# Patient Record
Sex: MALE | Race: WHITE | NOT HISPANIC OR LATINO | Employment: OTHER | URBAN - METROPOLITAN AREA
[De-identification: names, ages, dates, MRNs, and addresses within clinical notes are randomized per-mention and may not be internally consistent; named-entity substitution may affect disease eponyms.]

---

## 2017-01-16 ENCOUNTER — OFFICE VISIT (OUTPATIENT)
Dept: CARDIOLOGY | Facility: CLINIC | Age: 57
End: 2017-01-16

## 2017-01-16 VITALS
DIASTOLIC BLOOD PRESSURE: 84 MMHG | HEART RATE: 62 BPM | HEIGHT: 69 IN | BODY MASS INDEX: 38.51 KG/M2 | WEIGHT: 260 LBS | SYSTOLIC BLOOD PRESSURE: 118 MMHG

## 2017-01-16 DIAGNOSIS — I10 ESSENTIAL HYPERTENSION: ICD-10-CM

## 2017-01-16 DIAGNOSIS — I25.5 ISCHEMIC CARDIOMYOPATHY: Primary | ICD-10-CM

## 2017-01-16 DIAGNOSIS — E78.5 DYSLIPIDEMIA: ICD-10-CM

## 2017-01-16 PROCEDURE — 99213 OFFICE O/P EST LOW 20 MIN: CPT | Performed by: PHYSICIAN ASSISTANT

## 2017-01-16 RX ORDER — ATORVASTATIN CALCIUM 40 MG/1
40 TABLET, FILM COATED ORAL NIGHTLY
Qty: 90 TABLET | Refills: 3 | Status: SHIPPED | OUTPATIENT
Start: 2017-01-16 | End: 2019-03-18 | Stop reason: SDUPTHER

## 2017-01-16 RX ORDER — CARVEDILOL 25 MG/1
25 TABLET ORAL 2 TIMES DAILY
Qty: 180 TABLET | Refills: 3 | Status: SHIPPED | OUTPATIENT
Start: 2017-01-16 | End: 2019-03-18 | Stop reason: SDUPTHER

## 2017-01-16 RX ORDER — GABAPENTIN 300 MG/1
600 CAPSULE ORAL 2 TIMES DAILY
COMMUNITY
Start: 2017-01-09 | End: 2018-09-12 | Stop reason: DRUGHIGH

## 2017-01-16 RX ORDER — POTASSIUM CHLORIDE 750 MG/1
10 CAPSULE, EXTENDED RELEASE ORAL DAILY
Qty: 90 CAPSULE | Refills: 3 | Status: SHIPPED | OUTPATIENT
Start: 2017-01-16 | End: 2018-09-12 | Stop reason: SDDI

## 2017-01-16 RX ORDER — CLOPIDOGREL BISULFATE 75 MG/1
75 TABLET ORAL DAILY
Qty: 90 TABLET | Refills: 3 | Status: SHIPPED | OUTPATIENT
Start: 2017-01-16 | End: 2019-03-18 | Stop reason: SDUPTHER

## 2017-01-16 RX ORDER — NITROGLYCERIN 0.4 MG/1
0.4 TABLET SUBLINGUAL AS NEEDED
COMMUNITY
Start: 2017-01-09

## 2017-01-16 RX ORDER — TORSEMIDE 20 MG/1
20 TABLET ORAL DAILY
Qty: 90 TABLET | Refills: 3 | Status: SHIPPED | OUTPATIENT
Start: 2017-01-16 | End: 2019-03-18 | Stop reason: SDUPTHER

## 2017-01-16 NOTE — LETTER
January 16, 2017     Osvaldo Vargas DO  [address]  [address]  [place] [address]    Patient: Pawan Easton   YOB: 1960   Date of Visit: 1/16/2017       Dear Dr. Sam DO:    Thank you for referring Pawan Easton to me for evaluation. Below are the relevant portions of my assessment and plan of care.    If you have questions, please do not hesitate to call me. I look forward to following Pawan along with you.         Sincerely,        RITA Olguin        CC: No Recipients  RITA Olguin  1/16/2017  3:27 PM  Signed       [facility] - Office Note  Pawan Easton         [address]  [place] [address]  1960   [phone] (home)      LOCATION:  [facility].  Visit Type: Follow Up.    PCP:  Osvaldo Vargas DO    01/16/17   Pawan Easton is a 56 y.o.  male  retired.      Chief Complaint:  Follow-up on Coronary Artery Disease.  Pain in bilateral nipple area.  1. Coronary artery disease:  a. 12/29/2016, cardiac catheterization for ST elevated myocardial infarction with multiple lesions in the RCA in the mid and into the proximal segment. RV branch had an 80% ostial stenosis; 60% to 70% mid RCA; LAD had subtotal occlusion; circumflex OM was 40%.  b. 12/30/2015, reported placement of a Promus drug-eluting stent.  c. January 2016, cardiac catheterization for acute ST elevated myocardial infarction with placement of a 3.0 x 16 Promus drug-eluting stent to the proximal LAD. EF of 35%.  d. 02/05/2016, cardiac catheterization by Dr. Joey Self which showed an EF of 10% to 15%. Widely patent proximal mid LAD stent. Small jailed 80% to 90% first diagonal (1.5 mm vessel). Diffuse 80% stenosis of the ramus intermedius. 30% RCA and right PDA, and 50% distal LAD.   2. Ischemic cardiomyopathy/class 1-2:  a. 02/05/2016, echocardiogram with an EF of 25% to 30%, mild mitral regurgitation, and mild tricuspid regurgitation.  3. Systolic congestive heart failure:  a. February  2016 admission which required mechanical ventilation.   4. Hypertension.   5. Dyslipidemia.   6. Diabetes.   7. Chronic obstructive pulmonary disease.   8. Obstructive sleep apnea.   9. Obesity.   10. Chronic renal insufficiency.    No Known Allergies      Current Outpatient Prescriptions:   •  aspirin 325 MG tablet, Take 325 mg by mouth daily., Disp: , Rfl:   •  atorvastatin (LIPITOR) 40 MG tablet, Take 1 tablet by mouth every night., Disp: 90 tablet, Rfl: 3  •  buPROPion SR (WELLBUTRIN SR) 100 MG 12 hr tablet, Take 100 mg by mouth 2 (two) times a day., Disp: , Rfl:   •  carvedilol (COREG) 25 MG tablet, Take 1 tablet by mouth 2 (two) times a day., Disp: 180 tablet, Rfl: 3  •  clonazePAM (KlonoPIN) 0.5 MG tablet, Take 0.5 mg by mouth 2 (two) times a day as needed for seizures., Disp: , Rfl:   •  clopidogrel (PLAVIX) 75 MG tablet, Take 1 tablet by mouth Daily., Disp: 90 tablet, Rfl: 3  •  ENTRESTO 24-26 MG tablet, TAKE ONE TABLET BY MOUTH TWICE DAILY, Disp: 180 tablet, Rfl: 2  •  gabapentin (NEURONTIN) 300 MG capsule, 2 (Two) Times a Day., Disp: , Rfl:   •  glipiZIDE (GLUCOTROL) 5 MG tablet, Take 5 mg by mouth daily., Disp: , Rfl:   •  nitroglycerin (NITROSTAT) 0.4 MG SL tablet, As Needed., Disp: , Rfl:   •  Omega-3 Fatty Acids (FISH OIL) 1000 MG capsule capsule, Take  by mouth 2 (two) times a day., Disp: , Rfl:   •  pantoprazole (PROTONIX) 40 MG EC tablet, Take 40 mg by mouth daily., Disp: , Rfl:   •  potassium chloride (MICRO-K) 10 MEQ CR capsule, Take 1 capsule by mouth daily., Disp: 90 capsule, Rfl: 3  •  sitaGLIPtin (JANUVIA) 100 MG tablet, Take 100 mg by mouth daily., Disp: , Rfl:   •  spironolactone (ALDACTONE) 50 MG tablet, Take 1 tablet by mouth daily., Disp: 90 tablet, Rfl: 3  •  torsemide (DEMADEX) 20 MG tablet, Take 1 tablet by mouth daily., Disp: 90 tablet, Rfl: 3    HPI  Mr. corona is here for routine follow-up on his cardiomyopathy, hypertension and dyslipidemia.  Since his last visit, he's done fairly  "well from a cardiac standpoint.  He has no complaints of chest pain, no progressive dyspnea or weakness.  He is compliant with his CPAP but is very restless at night.  He states he rolls back and forth a lot and believes this is cause for muscle pain in his chest area.  He also complains of bilateral nipple pain described as a burning sensation.  It is tender to touch.  He thinks his breast tissue in that area may be bigger but cannot confirm with certainty.  The following portions of the patient's history were reviewed in the chart and updated as appropriate: allergies, current medications, past family history, past medical history, past social history, past surgical history and problem list.    Review of Systems   All other systems reviewed and are negative.      height is 69\" (175.3 cm) and weight is 260 lb (118 kg). His blood pressure is 118/84 and his pulse is 62.   Physical Exam   Constitutional: Vital signs are normal. He appears well-developed and well-nourished.   Cardiovascular: Normal rate, regular rhythm, S1 normal, S2 normal, normal heart sounds, intact distal pulses and normal pulses.    Pulmonary/Chest: Effort normal and breath sounds normal. He has no wheezes. He has no rhonchi. He has no rales.   Abdominal: Soft. Normal appearance and bowel sounds are normal. There is no hepatosplenomegaly.   Neurological: He is alert.         Procedures   St. Farhan ICD:  Atrial P wave 2.8, threshold 0.87, impedance 380 ohms.  Pacing 2.3%.  RV pacing less than 1%, R-wave greater than 12, threshold 1.25, impedance 530 ohms.  Battery voltage 92% with a charge time of 7 seconds.  No A. fib.  Assessment/ Plan     Ischemic cardiomyopathy:  Stable and asymptomatic.  Device interrogation is within normal limits.  Continue on current medical regimen with the cessation of Aldactone secondary to possible gynecomastia and nipple pain.  I advised him to hold this for 10 days and call us with any symptoms that are persistent or " those that resolved.  Return to clinic 6 months with a repeat device check or sooner when necessary.    Essential hypertension:  Well-controlled.  Continue current medical regimen.    Dyslipidemia:  Continue with Lipitor, fish oil and appropriate diet with routine bloodwork by PCP.    RITA Olguin

## 2017-01-16 NOTE — PROGRESS NOTES
[facility] - Office Note  Pawan Easton         [address]  [place] [address]  1960   [phone] (home)      LOCATION:  [facility].  Visit Type: Follow Up.    PCP:  Osvaldo Vargas DO    01/16/17   Pawan Easton is a 56 y.o.  male  retired.      Chief Complaint:  Follow-up on Coronary Artery Disease.  Pain in bilateral nipple area.  1. Coronary artery disease:  a. 12/29/2016, cardiac catheterization for ST elevated myocardial infarction with multiple lesions in the RCA in the mid and into the proximal segment. RV branch had an 80% ostial stenosis; 60% to 70% mid RCA; LAD had subtotal occlusion; circumflex OM was 40%.  b. 12/30/2015, reported placement of a Promus drug-eluting stent.  c. January 2016, cardiac catheterization for acute ST elevated myocardial infarction with placement of a 3.0 x 16 Promus drug-eluting stent to the proximal LAD. EF of 35%.  d. 02/05/2016, cardiac catheterization by Dr. Joey Self which showed an EF of 10% to 15%. Widely patent proximal mid LAD stent. Small jailed 80% to 90% first diagonal (1.5 mm vessel). Diffuse 80% stenosis of the ramus intermedius. 30% RCA and right PDA, and 50% distal LAD.   2. Ischemic cardiomyopathy/class 1-2:  a. 02/05/2016, echocardiogram with an EF of 25% to 30%, mild mitral regurgitation, and mild tricuspid regurgitation.  3. Systolic congestive heart failure:  a. February 2016 admission which required mechanical ventilation.   4. Hypertension.   5. Dyslipidemia.   6. Diabetes.   7. Chronic obstructive pulmonary disease.   8. Obstructive sleep apnea.   9. Obesity.   10. Chronic renal insufficiency.    No Known Allergies      Current Outpatient Prescriptions:   •  aspirin 325 MG tablet, Take 325 mg by mouth daily., Disp: , Rfl:   •  atorvastatin (LIPITOR) 40 MG tablet, Take 1 tablet by mouth every night., Disp: 90 tablet, Rfl: 3  •  buPROPion SR (WELLBUTRIN SR) 100 MG 12 hr tablet, Take 100 mg by mouth 2 (two)  times a day., Disp: , Rfl:   •  carvedilol (COREG) 25 MG tablet, Take 1 tablet by mouth 2 (two) times a day., Disp: 180 tablet, Rfl: 3  •  clonazePAM (KlonoPIN) 0.5 MG tablet, Take 0.5 mg by mouth 2 (two) times a day as needed for seizures., Disp: , Rfl:   •  clopidogrel (PLAVIX) 75 MG tablet, Take 1 tablet by mouth Daily., Disp: 90 tablet, Rfl: 3  •  ENTRESTO 24-26 MG tablet, TAKE ONE TABLET BY MOUTH TWICE DAILY, Disp: 180 tablet, Rfl: 2  •  gabapentin (NEURONTIN) 300 MG capsule, 2 (Two) Times a Day., Disp: , Rfl:   •  glipiZIDE (GLUCOTROL) 5 MG tablet, Take 5 mg by mouth daily., Disp: , Rfl:   •  nitroglycerin (NITROSTAT) 0.4 MG SL tablet, As Needed., Disp: , Rfl:   •  Omega-3 Fatty Acids (FISH OIL) 1000 MG capsule capsule, Take  by mouth 2 (two) times a day., Disp: , Rfl:   •  pantoprazole (PROTONIX) 40 MG EC tablet, Take 40 mg by mouth daily., Disp: , Rfl:   •  potassium chloride (MICRO-K) 10 MEQ CR capsule, Take 1 capsule by mouth daily., Disp: 90 capsule, Rfl: 3  •  sitaGLIPtin (JANUVIA) 100 MG tablet, Take 100 mg by mouth daily., Disp: , Rfl:   •  spironolactone (ALDACTONE) 50 MG tablet, Take 1 tablet by mouth daily., Disp: 90 tablet, Rfl: 3  •  torsemide (DEMADEX) 20 MG tablet, Take 1 tablet by mouth daily., Disp: 90 tablet, Rfl: 3    HPI  Mr. corona is here for routine follow-up on his cardiomyopathy, hypertension and dyslipidemia.  Since his last visit, he's done fairly well from a cardiac standpoint.  He has no complaints of chest pain, no progressive dyspnea or weakness.  He is compliant with his CPAP but is very restless at night.  He states he rolls back and forth a lot and believes this is cause for muscle pain in his chest area.  He also complains of bilateral nipple pain described as a burning sensation.  It is tender to touch.  He thinks his breast tissue in that area may be bigger but cannot confirm with certainty.  The following portions of the patient's history were reviewed in the chart and  "updated as appropriate: allergies, current medications, past family history, past medical history, past social history, past surgical history and problem list.    Review of Systems   All other systems reviewed and are negative.      height is 69\" (175.3 cm) and weight is 260 lb (118 kg). His blood pressure is 118/84 and his pulse is 62.   Physical Exam   Constitutional: Vital signs are normal. He appears well-developed and well-nourished.   Cardiovascular: Normal rate, regular rhythm, S1 normal, S2 normal, normal heart sounds, intact distal pulses and normal pulses.    Pulmonary/Chest: Effort normal and breath sounds normal. He has no wheezes. He has no rhonchi. He has no rales.   Abdominal: Soft. Normal appearance and bowel sounds are normal. There is no hepatosplenomegaly.   Neurological: He is alert.         Procedures   [employer] Farhan ICD:  Atrial P wave 2.8, threshold 0.87, impedance 380 ohms.  Pacing 2.3%.  RV pacing less than 1%, R-wave greater than 12, threshold 1.25, impedance 530 ohms.  Battery voltage 92% with a charge time of 7 seconds.  No A. fib.  Assessment/ Plan     Ischemic cardiomyopathy:  Stable and asymptomatic.  Device interrogation is within normal limits.  Continue on current medical regimen with the cessation of Aldactone secondary to possible gynecomastia and nipple pain.  I advised him to hold this for 10 days and call us with any symptoms that are persistent or those that resolved.  Return to clinic 6 months with a repeat device check or sooner when necessary.    Essential hypertension:  Well-controlled.  Continue current medical regimen.    Dyslipidemia:  Continue with Lipitor, fish oil and appropriate diet with routine bloodwork by PCP.    RITA Olguin  "

## 2017-01-16 NOTE — MR AVS SNAPSHOT
Pawan DESIRAE Jemma   1/16/2017 2:15 PM   Office Visit    Dept Phone:  [phone]   Encounter #:  [id number]    Provider:  RITA Julian   Department:  [facility] CARDIOLOG[facility]                Your Full Care Plan              Your Updated Medication List          This list is accurate as of: 1/16/17  3:23 PM.  Always use your most recent med list.                aspirin 325 MG tablet       atorvastatin 40 MG tablet   Commonly known as:  LIPITOR   Take 1 tablet by mouth every night.       buPROPion  MG 12 hr tablet   Commonly known as:  WELLBUTRIN SR       carvedilol 25 MG tablet   Commonly known as:  COREG   Take 1 tablet by mouth 2 (two) times a day.       clonazePAM 0.5 MG tablet   Commonly known as:  KlonoPIN       clopidogrel 75 MG tablet   Commonly known as:  PLAVIX   Take 1 tablet by mouth Daily.       ENTRESTO 24-26 MG tablet   Generic drug:  sacubitril-valsartan   TAKE ONE TABLET BY MOUTH TWICE DAILY       fish oil 1000 MG capsule capsule       gabapentin 300 MG capsule   Commonly known as:  NEURONTIN       glipiZIDE 5 MG tablet   Commonly known as:  GLUCOTROL       nitroglycerin 0.4 MG SL tablet   Commonly known as:  NITROSTAT       pantoprazole 40 MG EC tablet   Commonly known as:  PROTONIX       potassium chloride 10 MEQ CR capsule   Commonly known as:  MICRO-K   Take 1 capsule by mouth daily.       SITagliptin 100 MG tablet   Commonly known as:  JANUVIA       spironolactone 50 MG tablet   Commonly known as:  ALDACTONE   Take 1 tablet by mouth daily.       torsemide 20 MG tablet   Commonly known as:  DEMADEX   Take 1 tablet by mouth daily.               You Were Diagnosed With        Codes Comments    Ischemic cardiomyopathy    -  Primary ICD-10-CM: I25.5  ICD-9-CM: 414.8     Essential hypertension     ICD-10-CM: I10  ICD-9-CM: 401.9     Dyslipidemia     ICD-10-CM: E78.5  ICD-9-CM: 272.4       Instructions     None    Patient Instructions History    "  Upcoming Appointments     Visit Type Date Time Department    FOLLOW UP 1/16/2017  2:15 PM [facility]    FOLLOW UP 8/14/2017  2:15 PM [facility]      MyChart Signup     Our records indicate that you have declined [facility] MyChart signup. If you would like to sign up for MyChart, please email [email or web address] or call [phone] to obtain an activation code.             Other Info from Your Visit           Your Appointments     Aug 14, 2017  2:15 PM EDT   Follow Up with Roberto Willingham MD   [facility] (--)    [address] [place] [address]  [facility] [address] [phone]           Arrive 15 minutes prior to appointment.              Allergies     No Known Allergies      Reason for Visit     Follow-up     Coronary Artery Disease           Vital Signs     Blood Pressure Pulse Height Weight Body Mass Index Smoking Status    118/84 (BP Location: Left arm, Patient Position: Sitting) 62 69\" (175.3 cm) 260 lb (118 kg) 38.4 kg/m2 Former Smoker      Problems and Diagnoses Noted     Dyslipidemia    High blood pressure    Ischemic cardiomyopathy        "

## 2017-05-10 ENCOUNTER — CLINICAL SUPPORT NO REQUIREMENTS (OUTPATIENT)
Dept: CARDIOLOGY | Facility: CLINIC | Age: 57
End: 2017-05-10

## 2017-05-10 DIAGNOSIS — I25.5 ISCHEMIC CARDIOMYOPATHY: ICD-10-CM

## 2017-05-10 PROCEDURE — 93295 DEV INTERROG REMOTE 1/2/MLT: CPT | Performed by: PHYSICIAN ASSISTANT

## 2017-05-10 PROCEDURE — 93296 REM INTERROG EVL PM/IDS: CPT | Performed by: PHYSICIAN ASSISTANT

## 2017-08-30 ENCOUNTER — OFFICE VISIT (OUTPATIENT)
Dept: CARDIOLOGY | Facility: CLINIC | Age: 57
End: 2017-08-30

## 2017-08-30 VITALS
DIASTOLIC BLOOD PRESSURE: 78 MMHG | WEIGHT: 264 LBS | BODY MASS INDEX: 40.01 KG/M2 | HEIGHT: 68 IN | SYSTOLIC BLOOD PRESSURE: 116 MMHG | HEART RATE: 72 BPM

## 2017-08-30 DIAGNOSIS — E78.5 DYSLIPIDEMIA: ICD-10-CM

## 2017-08-30 DIAGNOSIS — I10 ESSENTIAL HYPERTENSION: ICD-10-CM

## 2017-08-30 DIAGNOSIS — I25.10 CORONARY ARTERY DISEASE INVOLVING NATIVE CORONARY ARTERY OF NATIVE HEART WITHOUT ANGINA PECTORIS: Primary | ICD-10-CM

## 2017-08-30 DIAGNOSIS — I50.22 CHRONIC SYSTOLIC CONGESTIVE HEART FAILURE (HCC): ICD-10-CM

## 2017-08-30 PROCEDURE — 99214 OFFICE O/P EST MOD 30 MIN: CPT | Performed by: INTERNAL MEDICINE

## 2017-08-30 RX ORDER — ASPIRIN 81 MG/1
81 TABLET ORAL DAILY
Qty: 90 TABLET | Refills: 3 | Status: SHIPPED | OUTPATIENT
Start: 2017-08-30 | End: 2018-09-12

## 2017-08-30 NOTE — PROGRESS NOTES
[facility]  Office visit  Pawan Easton  1960  [phone]    VISIT DATE:  08/30/2017    PCP: Osvaldo Vargas DO  [address]  [place] [address]    CC:  Chief Complaint   Patient presents with   • Coronary Artery Disease       PROBLEM LIST:  1. Coronary artery disease:  a. 12/29/2016, cardiac catheterization for ST elevated myocardial infarction with multiple lesions in the RCA in the mid and into the proximal segment. RV branch had an 80% ostial stenosis; 60% to 70% mid RCA; LAD had subtotal occlusion; circumflex OM was 40%.  b. 12/30/2015, reported placement of a Promus drug-eluting stent.  c. January 2016, cardiac catheterization for acute ST elevated myocardial infarction with placement of a 3.0 x 16 Promus drug-eluting stent to the proximal LAD. EF of 35%.  d. 02/05/2016, cardiac catheterization by Dr. Joey Self which showed an EF of 10% to 15%. Widely patent proximal mid LAD stent. Small jailed 80% to 90% first diagonal (1.5 mm vessel). Diffuse 80% stenosis of the ramus intermedius. 30% RCA and right PDA, and 50% distal LAD.   2. Ischemic cardiomyopathy/class 1-2:  a. 02/05/2016, echocardiogram with an EF of 25% to 30%, mild mitral regurgitation, and mild tricuspid regurgitation.  3. Systolic congestive heart failure:  a. February 2016 admission which required mechanical ventilation.   4. Hypertension.   5. Dyslipidemia.   6. Diabetes.   7. Chronic obstructive pulmonary disease.   8. Obstructive sleep apnea.   9. Obesity.   10. Chronic renal insufficiency.      ASSESSMENT:   Diagnosis Plan   1. Coronary artery disease involving native coronary artery of native heart without angina pectoris     2. Chronic systolic congestive heart failure     3. Essential hypertension     4. Dyslipidemia         PLAN:  Continue carvedilol, Entresto and torsemide  Decreasing aspirin 81 mg by mouth daily, continue clopidogrel  Continue high intensity statin therapy  Follow-up in 6  "months    Subjective  Reports stable functional capacity.  Shortness of breath and a class II pattern.  Using a cane for ambulation.  Denies chest pain.  No palpitations.  No presyncope or syncope.  Blood pressures running less than 130/80 mmHg.  He is compliant with medical therapy.  Does report some easy bruising on combination of low-dose aspirin and Plavix.  Device interrogation today revealed adequate atrial ventricular sensing along with reasonable capture thresholds.  RV pulse width was increased to 0.8 ms to ensure adequate safety margin.    PHYSICAL EXAMINATION:  Vitals:    08/30/17 1421   BP: 116/78   BP Location: Left arm   Patient Position: Sitting   Pulse: 72   Weight: 264 lb (120 kg)   Height: 68\" (172.7 cm)     General Appearance:    Alert, cooperative, no distress, appears stated age   Head:    Normocephalic, without obvious abnormality, atraumatic   Eyes:    conjunctiva/corneas clear   Nose:   Nares normal, septum midline, mucosa normal, no drainage   Throat:   Lips, teeth and gums normal   Neck:   Supple, symmetrical, trachea midline, no carotid    bruit or JVD   Lungs:     Clear to auscultation bilaterally, respirations unlabored   Chest Wall:    No tenderness or deformity    Heart:    Regular rate and rhythm, S1 and S2 normal, no murmur, rub   or gallop, normal carotid impulse bilaterally without bruit.   Abdomen:     Soft, non-tender   Extremities:   Extremities normal, atraumatic, no cyanosis or edema   Pulses:   2+ and symmetric all extremities   Skin:   Skin color, texture, turgor normal, no rashes or lesions       Diagnostic Data:  Procedures  Lab Results   Component Value Date    CHLPL 164 04/22/2016    TRIG 668 (H) 04/22/2016    HDL 28 (L) 04/22/2016    LDLDIRECT 68 04/22/2016     Lab Results   Component Value Date    GLUCOSE 239 (H) 04/22/2016    BUN 19 04/22/2016    CREATININE 1.5 (H) 04/22/2016     04/22/2016    K 4.1 04/22/2016     04/22/2016    CO2 28 04/22/2016     Lab " Results   Component Value Date    HGBA1C 7.7 (H) 04/22/2016     Lab Results   Component Value Date    WBC 9.28 04/22/2016    HGB 13.8 04/22/2016    HCT 39.2 04/22/2016     04/22/2016       Allergies  No Known Allergies    Current Medications    Current Outpatient Prescriptions:   •  atorvastatin (LIPITOR) 40 MG tablet, Take 1 tablet by mouth Every Night., Disp: 90 tablet, Rfl: 3  •  buPROPion SR (WELLBUTRIN SR) 100 MG 12 hr tablet, Take 100 mg by mouth 2 (two) times a day., Disp: , Rfl:   •  carvedilol (COREG) 25 MG tablet, Take 1 tablet by mouth 2 (Two) Times a Day., Disp: 180 tablet, Rfl: 3  •  clonazePAM (KlonoPIN) 0.5 MG tablet, Take 0.5 mg by mouth 2 (two) times a day as needed for seizures., Disp: , Rfl:   •  clopidogrel (PLAVIX) 75 MG tablet, Take 1 tablet by mouth Daily., Disp: 90 tablet, Rfl: 3  •  gabapentin (NEURONTIN) 300 MG capsule, 600 mg 2 (Two) Times a Day., Disp: , Rfl:   •  glipiZIDE (GLUCOTROL) 5 MG tablet, Take 5 mg by mouth daily., Disp: , Rfl:   •  nitroglycerin (NITROSTAT) 0.4 MG SL tablet, As Needed., Disp: , Rfl:   •  Omega-3 Fatty Acids (FISH OIL) 1000 MG capsule capsule, Take  by mouth 2 (two) times a day., Disp: , Rfl:   •  pantoprazole (PROTONIX) 40 MG EC tablet, Take 40 mg by mouth daily., Disp: , Rfl:   •  potassium chloride (MICRO-K) 10 MEQ CR capsule, Take 1 capsule by mouth Daily., Disp: 90 capsule, Rfl: 3  •  sacubitril-valsartan (ENTRESTO) 24-26 MG tablet, Take 1 tablet by mouth 2 (Two) Times a Day., Disp: 180 tablet, Rfl: 3  •  sitaGLIPtin (JANUVIA) 100 MG tablet, Take 100 mg by mouth daily., Disp: , Rfl:   •  torsemide (DEMADEX) 20 MG tablet, Take 1 tablet by mouth Daily., Disp: 90 tablet, Rfl: 3  •  aspirin 81 MG EC tablet, Take 1 tablet by mouth Daily., Disp: 90 tablet, Rfl: 3          ROS  Review of Systems   Constitution: Negative.   HENT: Negative.    Eyes: Negative.    Cardiovascular: Negative.    Respiratory: Negative.    Endocrine: Negative.     Hematologic/Lymphatic: Negative.    Skin: Negative.    Musculoskeletal: Negative.    Gastrointestinal: Negative.    Genitourinary: Negative.    Neurological: Negative.    Psychiatric/Behavioral: Negative.    Allergic/Immunologic: Negative.        SOCIAL HX  Social History     Social History   • Marital status:      Spouse name: N/A   • Number of children: N/A   • Years of education: N/A     Occupational History   • Not on file.     Social History Main Topics   • Smoking status: Former Smoker     Types: Cigarettes     Quit date: 12/16/2016   • Smokeless tobacco: Never Used   • Alcohol use No   • Drug use: No   • Sexual activity: Defer     Other Topics Concern   • Not on file     Social History Narrative       FAMILY HX  Family History   Problem Relation Age of Onset   • Heart attack Mother              Osvaldo Mijares III, MD, FACC

## 2017-11-21 NOTE — TELEPHONE ENCOUNTER
Daughter called and states Entresto was refused at pharmacy and that her dad is out of RX.  Called pharmacy and they advised needs a PA.  PA done on line and called daughter back and advised to come get samples from office.  She verbalized understanding and will come to office today to .

## 2017-12-06 ENCOUNTER — TELEPHONE (OUTPATIENT)
Dept: CARDIOLOGY | Facility: CLINIC | Age: 57
End: 2017-12-06

## 2017-12-06 NOTE — TELEPHONE ENCOUNTER
Attempted to call patient to let him know his merlin monitor is not connecting. No answer and could not leave a message.

## 2018-03-07 ENCOUNTER — OFFICE VISIT (OUTPATIENT)
Dept: CARDIOLOGY | Facility: CLINIC | Age: 58
End: 2018-03-07

## 2018-03-07 VITALS
SYSTOLIC BLOOD PRESSURE: 124 MMHG | BODY MASS INDEX: 39.04 KG/M2 | HEART RATE: 74 BPM | WEIGHT: 263.6 LBS | DIASTOLIC BLOOD PRESSURE: 80 MMHG | HEIGHT: 69 IN

## 2018-03-07 DIAGNOSIS — I25.5 ISCHEMIC CARDIOMYOPATHY: ICD-10-CM

## 2018-03-07 DIAGNOSIS — I10 ESSENTIAL HYPERTENSION: ICD-10-CM

## 2018-03-07 DIAGNOSIS — I50.22 CHRONIC SYSTOLIC CONGESTIVE HEART FAILURE (HCC): ICD-10-CM

## 2018-03-07 DIAGNOSIS — I25.10 CORONARY ARTERY DISEASE INVOLVING NATIVE CORONARY ARTERY OF NATIVE HEART WITHOUT ANGINA PECTORIS: Primary | ICD-10-CM

## 2018-03-07 PROCEDURE — 93283 PRGRMG EVAL IMPLANTABLE DFB: CPT | Performed by: INTERNAL MEDICINE

## 2018-03-07 PROCEDURE — 99214 OFFICE O/P EST MOD 30 MIN: CPT | Performed by: INTERNAL MEDICINE

## 2018-03-07 NOTE — PROGRESS NOTES
[facility] at [facility]  Office visit  Pawan Easton  1960  [phone]    VISIT DATE:  08/30/2017    PCP: Osvaldo Vargas DO  [address]  [place] [address]    CC:  Chief Complaint   Patient presents with   • Coronary Artery Disease       PROBLEM LIST:  1. Coronary artery disease:  a. 12/29/2016, cardiac catheterization for ST elevated myocardial infarction with multiple lesions in the RCA in the mid and into the proximal segment. RV branch had an 80% ostial stenosis; 60% to 70% mid RCA; LAD had subtotal occlusion; circumflex OM was 40%.  b. 12/30/2015, reported placement of a Promus drug-eluting stent.  c. January 2016, cardiac catheterization for acute ST elevated myocardial infarction with placement of a 3.0 x 16 Promus drug-eluting stent to the proximal LAD. EF of 35%.  d. 02/05/2016, cardiac catheterization by Dr. Joey Self which showed an EF of 10% to 15%. Widely patent proximal mid LAD stent. Small jailed 80% to 90% first diagonal (1.5 mm vessel). Diffuse 80% stenosis of the ramus intermedius. 30% RCA and right PDA, and 50% distal LAD.   2. Ischemic cardiomyopathy/class 1-2:  a. 02/05/2016, echocardiogram with an EF of 25% to 30%, mild mitral regurgitation, and mild tricuspid regurgitation.  3. Systolic congestive heart failure:  a. February 2016 admission which required mechanical ventilation.   4. Hypertension.   5. Dyslipidemia.   6. Diabetes.   7. Chronic obstructive pulmonary disease.   8. Obstructive sleep apnea.   9. Obesity.   10. Chronic renal insufficiency.      ASSESSMENT:   Diagnosis Plan   1. Coronary artery disease involving native coronary artery of native heart without angina pectoris     2. Ischemic cardiomyopathy     3. Chronic systolic congestive heart failure     4. Essential hypertension       Device interrogation:  [employer] dual-chamber ICD  Less 1% atrial pacing, sensed P-wave 1 mV, threshold 1.25 V at 0.5 ms, impedance 380 ohms  Less 1% ventricular  "pacing, sensed R wave greater than 12 mV, threshold 1.25 V at 0.8 ms, impedance 440 ohms  Shock impedance 80 ohms X Staples no arrhythmia  Estimated battery life 7.2 years    PLAN:  Congestive heart failure, chronic, systolic: [place] heart class 2-3.  Continue carvedilol, Entresto(increasing to 49-51 mg by mouth twice a day) and torsemide    Coronary artery disease: Currently stable and asymptomatic.  Continue current medical therapy.      Hyperlipidemia: Continue high intensity statin therapy, goal LDL less than 100.,  Ideally less than 70.    COPD: Instruction to follow-up with his primary care physician to consider titration of medical therapy for COPD to include addition of maintenance inhaler to limit use of rescue beta agonist inhaler.      Subjective  Worsening functional capacity.  Shortness of breath and a class II-III pattern.  Ports that he is using his albuterol rescue nebulizer and inhaler on a daily basis.  Does report that his shortness of breath improved after albuterol use.  Using a cane for ambulation.  Denies chest pain.  No palpitations.  No presyncope or syncope.  Blood pressures running less than 130/80 mmHg.  He is compliant with medical therapy.  Recovering from influenza.    PHYSICAL EXAMINATION:  Vitals:    03/07/18 1358   BP: 124/80   BP Location: Right arm   Patient Position: Sitting   Pulse: 74   Weight: 120 kg (263 lb 9.6 oz)   Height: 175.3 cm (69\")     General Appearance:    Alert, cooperative, no distress, appears stated age   Head:    Normocephalic, without obvious abnormality, atraumatic   Eyes:    conjunctiva/corneas clear   Nose:   Nares normal, septum midline, mucosa normal, no drainage   Throat:   Lips, teeth and gums normal   Neck:   Supple, symmetrical, trachea midline, no carotid    bruit or JVD   Lungs:     Clear to auscultation bilaterally, respirations unlabored   Chest Wall:    No tenderness or deformity    Heart:    Regular rate and rhythm, S1 and S2 normal, no murmur, " rub   or gallop, normal carotid impulse bilaterally without bruit.   Abdomen:     Soft, non-tender   Extremities:   Extremities normal, atraumatic, no cyanosis or edema   Pulses:   2+ and symmetric all extremities   Skin:   Skin color, texture, turgor normal, no rashes or lesions       Diagnostic Data:  Procedures  Lab Results   Component Value Date    CHLPL 164 04/22/2016    TRIG 668 (H) 04/22/2016    HDL 28 (L) 04/22/2016     Lab Results   Component Value Date    GLUCOSE 239 (H) 04/22/2016    BUN 19 04/22/2016    CREATININE 1.5 (H) 04/22/2016     04/22/2016    K 4.1 04/22/2016     04/22/2016    CO2 28 04/22/2016     Lab Results   Component Value Date    HGBA1C 7.7 (H) 04/22/2016     Lab Results   Component Value Date    WBC 9.28 04/22/2016    HGB 13.8 04/22/2016    HCT 39.2 04/22/2016     04/22/2016       Allergies  No Known Allergies    Current Medications    Current Outpatient Prescriptions:   •  aspirin 81 MG EC tablet, Take 1 tablet by mouth Daily., Disp: 90 tablet, Rfl: 3  •  atorvastatin (LIPITOR) 40 MG tablet, Take 1 tablet by mouth Every Night., Disp: 90 tablet, Rfl: 3  •  buPROPion SR (WELLBUTRIN SR) 100 MG 12 hr tablet, Take 100 mg by mouth 2 (two) times a day., Disp: , Rfl:   •  carvedilol (COREG) 25 MG tablet, Take 1 tablet by mouth 2 (Two) Times a Day., Disp: 180 tablet, Rfl: 3  •  clonazePAM (KlonoPIN) 0.5 MG tablet, Take 0.5 mg by mouth 2 (two) times a day as needed for seizures., Disp: , Rfl:   •  clopidogrel (PLAVIX) 75 MG tablet, Take 1 tablet by mouth Daily., Disp: 90 tablet, Rfl: 3  •  gabapentin (NEURONTIN) 300 MG capsule, 600 mg 2 (Two) Times a Day., Disp: , Rfl:   •  glipiZIDE (GLUCOTROL) 5 MG tablet, Take 5 mg by mouth daily., Disp: , Rfl:   •  nitroglycerin (NITROSTAT) 0.4 MG SL tablet, As Needed., Disp: , Rfl:   •  Omega-3 Fatty Acids (FISH OIL) 1000 MG capsule capsule, Take  by mouth 2 (two) times a day., Disp: , Rfl:   •  pantoprazole (PROTONIX) 40 MG EC tablet, Take  40 mg by mouth daily., Disp: , Rfl:   •  potassium chloride (MICRO-K) 10 MEQ CR capsule, Take 1 capsule by mouth Daily., Disp: 90 capsule, Rfl: 3  •  sacubitril-valsartan (ENTRESTO) 24-26 MG tablet, Take 1 tablet by mouth 2 (Two) Times a Day., Disp: 180 tablet, Rfl: 3  •  sitaGLIPtin (JANUVIA) 100 MG tablet, Take 100 mg by mouth daily., Disp: , Rfl:   •  torsemide (DEMADEX) 20 MG tablet, Take 1 tablet by mouth Daily., Disp: 90 tablet, Rfl: 3          ROS  Review of Systems   Constitution: Negative.   HENT: Negative.    Eyes: Negative.    Cardiovascular: Positive for orthopnea. Negative for chest pain and leg swelling.   Respiratory: Positive for shortness of breath, snoring and wheezing.    Endocrine: Negative.    Hematologic/Lymphatic: Negative.    Skin: Negative.    Musculoskeletal: Negative.    Gastrointestinal: Negative.    Genitourinary: Negative.    Neurological: Negative.    Psychiatric/Behavioral: Negative.    Allergic/Immunologic: Negative.        SOCIAL HX  Social History     Social History   • Marital status:      Spouse name: N/A   • Number of children: N/A   • Years of education: N/A     Occupational History   • Not on file.     Social History Main Topics   • Smoking status: Former Smoker     Types: Cigarettes     Quit date: 12/16/2016   • Smokeless tobacco: Never Used   • Alcohol use No   • Drug use: No   • Sexual activity: Defer     Other Topics Concern   • Not on file     Social History Narrative       FAMILY HX  Family History   Problem Relation Age of Onset   • Heart attack Mother              Osvaldo Mijares III, MD, [facility]

## 2018-06-13 ENCOUNTER — CLINICAL SUPPORT NO REQUIREMENTS (OUTPATIENT)
Dept: CARDIOLOGY | Facility: CLINIC | Age: 58
End: 2018-06-13

## 2018-06-13 DIAGNOSIS — I25.5 ISCHEMIC CARDIOMYOPATHY: ICD-10-CM

## 2018-06-13 PROCEDURE — 93295 DEV INTERROG REMOTE 1/2/MLT: CPT | Performed by: INTERNAL MEDICINE

## 2018-06-13 PROCEDURE — 93296 REM INTERROG EVL PM/IDS: CPT | Performed by: INTERNAL MEDICINE

## 2018-09-12 ENCOUNTER — OFFICE VISIT (OUTPATIENT)
Dept: CARDIOLOGY | Facility: CLINIC | Age: 58
End: 2018-09-12

## 2018-09-12 VITALS
HEART RATE: 64 BPM | DIASTOLIC BLOOD PRESSURE: 84 MMHG | OXYGEN SATURATION: 95 % | BODY MASS INDEX: 39.37 KG/M2 | SYSTOLIC BLOOD PRESSURE: 136 MMHG | HEIGHT: 69 IN | WEIGHT: 265.8 LBS

## 2018-09-12 DIAGNOSIS — I25.10 CORONARY ARTERY DISEASE INVOLVING NATIVE CORONARY ARTERY OF NATIVE HEART WITHOUT ANGINA PECTORIS: ICD-10-CM

## 2018-09-12 DIAGNOSIS — I50.22 CHRONIC SYSTOLIC CONGESTIVE HEART FAILURE (HCC): Primary | ICD-10-CM

## 2018-09-12 DIAGNOSIS — I10 ESSENTIAL HYPERTENSION: ICD-10-CM

## 2018-09-12 PROCEDURE — 99214 OFFICE O/P EST MOD 30 MIN: CPT | Performed by: INTERNAL MEDICINE

## 2018-09-12 PROCEDURE — 93283 PRGRMG EVAL IMPLANTABLE DFB: CPT | Performed by: INTERNAL MEDICINE

## 2018-09-12 RX ORDER — GABAPENTIN 800 MG/1
800 TABLET ORAL 3 TIMES DAILY
COMMUNITY

## 2018-09-12 RX ORDER — SITAGLIPTIN 50 MG/1
1 TABLET, FILM COATED ORAL DAILY
COMMUNITY
Start: 2018-09-04

## 2018-09-12 RX ORDER — POTASSIUM CHLORIDE 750 MG/1
1 TABLET, EXTENDED RELEASE ORAL DAILY
COMMUNITY
Start: 2018-09-04

## 2018-09-12 RX ORDER — AMITRIPTYLINE HYDROCHLORIDE 50 MG/1
50 TABLET, FILM COATED ORAL NIGHTLY
COMMUNITY

## 2018-09-12 RX ORDER — GLIPIZIDE 10 MG/1
1 TABLET ORAL DAILY
COMMUNITY
Start: 2018-09-04

## 2018-09-12 RX ORDER — BUPROPION HYDROCHLORIDE 100 MG/1
1 TABLET ORAL DAILY
COMMUNITY
Start: 2018-09-04

## 2018-09-12 NOTE — PROGRESS NOTES
[facility] at [facility]  Office visit  Pawan Easton  1960  [phone]    VISIT DATE:  08/30/2017    PCP: Osvaldo Vargas DO  [address]  [place] [address]    CC:  Chief Complaint   Patient presents with   • Coronary Artery Disease   • Shortness of Breath   • Fatigue       PROBLEM LIST:  1. Coronary artery disease:  a. 12/29/2016, cardiac catheterization for ST elevated myocardial infarction with multiple lesions in the RCA in the mid and into the proximal segment. RV branch had an 80% ostial stenosis; 60% to 70% mid RCA; LAD had subtotal occlusion; circumflex OM was 40%.  b. 12/30/2015, reported placement of a Promus drug-eluting stent.  c. January 2016, cardiac catheterization for acute ST elevated myocardial infarction with placement of a 3.0 x 16 Promus drug-eluting stent to the proximal LAD. EF of 35%.  d. 02/05/2016, cardiac catheterization by Dr. Joey Self which showed an EF of 10% to 15%. Widely patent proximal mid LAD stent. Small jailed 80% to 90% first diagonal (1.5 mm vessel). Diffuse 80% stenosis of the ramus intermedius. 30% RCA and right PDA, and 50% distal LAD.   2. Ischemic cardiomyopathy/class 1-2:  a. 02/05/2016, echocardiogram with an EF of 25% to 30%, mild mitral regurgitation, and mild tricuspid regurgitation.  3. Systolic congestive heart failure:  a. February 2016 admission which required mechanical ventilation.   4. Hypertension.   5. Dyslipidemia.   6. Diabetes.   7. Chronic obstructive pulmonary disease.   8. Obstructive sleep apnea.   9. Obesity.   10. Chronic renal insufficiency.      ASSESSMENT:   Diagnosis Plan   1. Chronic systolic congestive heart failure (CMS/HCC)     2. Essential hypertension     3. Coronary artery disease involving native coronary artery of native heart without angina pectoris       Device interrogation:  [employer] dual-chamber ICD  Less 1% atrial pacing, sensed P-wave 1 mV, threshold 87 V at 0.5 ms, impedance 360 ohms  Less 1%  "ventricular pacing, sensed R wave greater than 12 mV, threshold 1.25 V at 0.8 ms, impedance 390 ohms  Shock impedance 80 ohms   no arrhythmia  Estimated battery life 4.9-6.8 years    PLAN:  Congestive heart failure, chronic, systolic: [place] heart class 2-3.  Continue carvedilol, Entresto(increasing to  mg by mouth twice a day) and torsemide    Coronary artery disease: Currently stable and asymptomatic.  Continue current medical therapy.      Hyperlipidemia: Continue high intensity statin therapy, goal LDL less than 100.,  Ideally less than 70.  Reasonable to hold fish oil supplementation and repeat fasting lipid panel.      Subjective  Stable shortness of breath and a [place] heart class II pattern.  Using a cane for ambulation.  Does have significant underlying COPD.  Most recent creatinine 1.3 with an estimated GFR of 60.  Trace hemoglobin A1c of 7.8.  Recent lipid panel not available today.  Blood pressures running less than 130/80 mmHg.  Has lightheadedness when he initially lies down in bed but then resolves quickly.  Denies presyncope, syncope.  No PND or orthopnea.  No lower extremity edema.  He is compliant with medical therapy.    PHYSICAL EXAMINATION:  Vitals:    09/12/18 1433   Weight: 121 kg (265 lb 12.8 oz)   Height: 175.3 cm (69\")     General Appearance:    Alert, cooperative, no distress, appears stated age   Head:    Normocephalic, without obvious abnormality, atraumatic   Eyes:    conjunctiva/corneas clear   Nose:   Nares normal, septum midline, mucosa normal, no drainage   Throat:   Lips, teeth and gums normal   Neck:   Supple, symmetrical, trachea midline, no carotid    bruit or JVD   Lungs:     Clear to auscultation bilaterally, respirations unlabored   Chest Wall:    No tenderness or deformity    Heart:    Regular rate and rhythm, S1 and S2 normal, no murmur, rub   or gallop, normal carotid impulse bilaterally without bruit.   Abdomen:     Soft, non-tender   Extremities:   Extremities " normal, atraumatic, no cyanosis or edema   Pulses:   2+ and symmetric all extremities   Skin:   Skin color, texture, turgor normal, no rashes or lesions       Diagnostic Data:  Procedures  Lab Results   Component Value Date    CHLPL 164 04/22/2016    TRIG 668 (H) 04/22/2016    HDL 28 (L) 04/22/2016     Lab Results   Component Value Date    GLUCOSE 239 (H) 04/22/2016    BUN 19 04/22/2016    CREATININE 1.5 (H) 04/22/2016     04/22/2016    K 4.1 04/22/2016     04/22/2016    CO2 28 04/22/2016     Lab Results   Component Value Date    HGBA1C 7.7 (H) 04/22/2016     Lab Results   Component Value Date    WBC 9.28 04/22/2016    HGB 13.8 04/22/2016    HCT 39.2 04/22/2016     04/22/2016       Allergies  No Known Allergies    Current Medications    Current Outpatient Prescriptions:   •  aspirin 81 MG EC tablet, Take 1 tablet by mouth Daily., Disp: 90 tablet, Rfl: 3  •  atorvastatin (LIPITOR) 40 MG tablet, Take 1 tablet by mouth Every Night., Disp: 90 tablet, Rfl: 3  •  buPROPion SR (WELLBUTRIN SR) 100 MG 12 hr tablet, Take 100 mg by mouth 2 (two) times a day., Disp: , Rfl:   •  carvedilol (COREG) 25 MG tablet, Take 1 tablet by mouth 2 (Two) Times a Day., Disp: 180 tablet, Rfl: 3  •  clonazePAM (KlonoPIN) 0.5 MG tablet, Take 0.5 mg by mouth 2 (two) times a day as needed for seizures., Disp: , Rfl:   •  clopidogrel (PLAVIX) 75 MG tablet, Take 1 tablet by mouth Daily., Disp: 90 tablet, Rfl: 3  •  glipiZIDE (GLUCOTROL) 5 MG tablet, Take 5 mg by mouth daily., Disp: , Rfl:   •  nitroglycerin (NITROSTAT) 0.4 MG SL tablet, As Needed., Disp: , Rfl:   •  Omega-3 Fatty Acids (FISH OIL) 1000 MG capsule capsule, Take  by mouth 2 (two) times a day., Disp: , Rfl:   •  pantoprazole (PROTONIX) 40 MG EC tablet, Take 40 mg by mouth daily., Disp: , Rfl:   •  potassium chloride (MICRO-K) 10 MEQ CR capsule, Take 1 capsule by mouth Daily., Disp: 90 capsule, Rfl: 3  •  sacubitril-valsartan (ENTRESTO) 49-51 MG tablet, Take 1 tablet  by mouth Every 12 (Twelve) Hours., Disp: 60 tablet, Rfl: 5  •  sitaGLIPtin (JANUVIA) 100 MG tablet, Take 100 mg by mouth daily., Disp: , Rfl:   •  torsemide (DEMADEX) 20 MG tablet, Take 1 tablet by mouth Daily., Disp: 90 tablet, Rfl: 3          ROS  Review of Systems   Constitution: Negative.   HENT: Negative.    Eyes: Negative.    Cardiovascular: Positive for dyspnea on exertion and orthopnea. Negative for chest pain and leg swelling.   Respiratory: Positive for shortness of breath and wheezing. Negative for snoring.    Endocrine: Negative.    Hematologic/Lymphatic: Negative.    Skin: Negative.    Musculoskeletal: Negative.    Gastrointestinal: Negative.    Genitourinary: Negative.    Neurological: Negative.    Psychiatric/Behavioral: Negative.    Allergic/Immunologic: Negative.        SOCIAL HX  Social History     Social History   • Marital status:      Spouse name: N/A   • Number of children: N/A   • Years of education: N/A     Occupational History   • Not on file.     Social History Main Topics   • Smoking status: Former Smoker     Types: Cigarettes     Quit date: 12/16/2016   • Smokeless tobacco: Never Used   • Alcohol use No   • Drug use: No   • Sexual activity: Defer     Other Topics Concern   • Not on file     Social History Narrative   • No narrative on file       FAMILY HX  Family History   Problem Relation Age of Onset   • Heart attack Mother              Osvaldo Mijares III, MD, FACC

## 2018-09-26 ENCOUNTER — PRIOR AUTHORIZATION (OUTPATIENT)
Dept: CARDIOLOGY | Facility: CLINIC | Age: 58
End: 2018-09-26

## 2018-09-26 NOTE — TELEPHONE ENCOUNTER
Prior auth initiated through covermymeds for Entresto 97/103 mg dose (BID). Pt previously on 49/51 mg dose.   Pt has new [employer] coverage. [employer] ID: [id number]  Covermymeds KEY: [id number]  Approved - until 9/25/2020  Faxed to [facility] [place].

## 2018-11-08 ENCOUNTER — TELEPHONE (OUTPATIENT)
Dept: CARDIOLOGY | Facility: CLINIC | Age: 58
End: 2018-11-08

## 2018-11-08 NOTE — TELEPHONE ENCOUNTER
Called pt due to [facility] home monitoring isn't connecting.  Tried troubleshooting with pt and unable to be successful.  Called [facility] and they are going to reach out to pt.

## 2018-11-14 ENCOUNTER — CLINICAL SUPPORT NO REQUIREMENTS (OUTPATIENT)
Dept: CARDIOLOGY | Facility: CLINIC | Age: 58
End: 2018-11-14

## 2018-11-14 DIAGNOSIS — I25.5 ISCHEMIC CARDIOMYOPATHY: ICD-10-CM

## 2018-11-14 PROCEDURE — 93295 DEV INTERROG REMOTE 1/2/MLT: CPT | Performed by: INTERNAL MEDICINE

## 2018-11-14 PROCEDURE — 93296 REM INTERROG EVL PM/IDS: CPT | Performed by: INTERNAL MEDICINE

## 2018-11-27 ENCOUNTER — TELEPHONE (OUTPATIENT)
Dept: CARDIOLOGY | Facility: CLINIC | Age: 58
End: 2018-11-27

## 2018-11-27 NOTE — TELEPHONE ENCOUNTER
Patient notified to decrease his Entresto to  49-51 mg by mouth twice a day. New Rx sent to his pharmacy. He verbalized understanding.

## 2019-03-18 ENCOUNTER — OFFICE VISIT (OUTPATIENT)
Dept: CARDIOLOGY | Facility: CLINIC | Age: 59
End: 2019-03-18

## 2019-03-18 VITALS
DIASTOLIC BLOOD PRESSURE: 70 MMHG | HEART RATE: 73 BPM | BODY MASS INDEX: 36.73 KG/M2 | SYSTOLIC BLOOD PRESSURE: 122 MMHG | OXYGEN SATURATION: 97 % | WEIGHT: 248 LBS | HEIGHT: 69 IN

## 2019-03-18 DIAGNOSIS — I25.5 ISCHEMIC CARDIOMYOPATHY: ICD-10-CM

## 2019-03-18 DIAGNOSIS — I50.22 CHRONIC SYSTOLIC CONGESTIVE HEART FAILURE (HCC): ICD-10-CM

## 2019-03-18 DIAGNOSIS — I10 ESSENTIAL HYPERTENSION: ICD-10-CM

## 2019-03-18 DIAGNOSIS — I25.10 CORONARY ARTERY DISEASE INVOLVING NATIVE CORONARY ARTERY OF NATIVE HEART WITHOUT ANGINA PECTORIS: Primary | ICD-10-CM

## 2019-03-18 PROCEDURE — 93283 PRGRMG EVAL IMPLANTABLE DFB: CPT | Performed by: INTERNAL MEDICINE

## 2019-03-18 PROCEDURE — 99214 OFFICE O/P EST MOD 30 MIN: CPT | Performed by: INTERNAL MEDICINE

## 2019-03-18 RX ORDER — CLOPIDOGREL BISULFATE 75 MG/1
75 TABLET ORAL DAILY
Qty: 90 TABLET | Refills: 3 | Status: SHIPPED | OUTPATIENT
Start: 2019-03-18 | End: 2020-01-14 | Stop reason: SDUPTHER

## 2019-03-18 RX ORDER — INSULIN GLARGINE 100 [IU]/ML
4 INJECTION, SOLUTION SUBCUTANEOUS DAILY
COMMUNITY

## 2019-03-18 RX ORDER — ATORVASTATIN CALCIUM 40 MG/1
40 TABLET, FILM COATED ORAL NIGHTLY
Qty: 90 TABLET | Refills: 3 | Status: SHIPPED | OUTPATIENT
Start: 2019-03-18 | End: 2020-01-14 | Stop reason: SDUPTHER

## 2019-03-18 RX ORDER — TORSEMIDE 20 MG/1
20 TABLET ORAL DAILY
Qty: 90 TABLET | Refills: 3 | Status: SHIPPED | OUTPATIENT
Start: 2019-03-18 | End: 2020-01-14 | Stop reason: SDUPTHER

## 2019-03-18 RX ORDER — CARVEDILOL 25 MG/1
25 TABLET ORAL 2 TIMES DAILY WITH MEALS
Qty: 180 TABLET | Refills: 3 | Status: SHIPPED | OUTPATIENT
Start: 2019-03-18 | End: 2020-01-14 | Stop reason: SDUPTHER

## 2019-03-18 NOTE — PROGRESS NOTES
[facility] at [facility]  Office visit  Pawan Easton  1960  [phone]    VISIT DATE:  08/30/2017    PCP: Osvaldo Vargas DO  [address]  [place] [address]    CC:  Chief Complaint   Patient presents with   • Congestive Heart Failure   • Coronary Artery Disease       PROBLEM LIST:  1. Coronary artery disease:  a. 12/29/2016, cardiac catheterization for ST elevated myocardial infarction with multiple lesions in the RCA in the mid and into the proximal segment. RV branch had an 80% ostial stenosis; 60% to 70% mid RCA; LAD had subtotal occlusion; circumflex OM was 40%.  b. 12/30/2015, reported placement of a Promus drug-eluting stent.  c. January 2016, cardiac catheterization for acute ST elevated myocardial infarction with placement of a 3.0 x 16 Promus drug-eluting stent to the proximal LAD. EF of 35%.  d. 02/05/2016, cardiac catheterization by Dr. Joey Self which showed an EF of 10% to 15%. Widely patent proximal mid LAD stent. Small jailed 80% to 90% first diagonal (1.5 mm vessel). Diffuse 80% stenosis of the ramus intermedius. 30% RCA and right PDA, and 50% distal LAD.   2. Ischemic cardiomyopathy/class 1-2:  a. 02/05/2016, echocardiogram with an EF of 25% to 30%, mild mitral regurgitation, and mild tricuspid regurgitation.  3. Systolic congestive heart failure:  a. February 2016 admission which required mechanical ventilation.   4. Hypertension.   5. Dyslipidemia.   6. Diabetes.   7. Chronic obstructive pulmonary disease.   8. Obstructive sleep apnea.   9. Obesity.   10. Chronic renal insufficiency.      ASSESSMENT:   Diagnosis Plan   1. Coronary artery disease involving native coronary artery of native heart without angina pectoris     2. Ischemic cardiomyopathy     3. Chronic systolic congestive heart failure (CMS/HCC)     4. Essential hypertension       Device interrogation:  [employer] dual-chamber ICD  Less 1% atrial pacing, sensed P-wave 2.3 mV, threshold 1 V at 0.5 ms,  "impedance 451 ohms  Less 1% ventricular pacing, sensed R wave greater than 12 mV, threshold 1.25 V at 0.8 ms, impedance 390 ohms  Shock impedance 75 4.8-6.3 ohms   no arrhythmia  Estimated battery life 4.9-6.8 years    PLAN:  Congestive heart failure, chronic, systolic: [place] heart class 2-3.  Continue carvedilol, moderate dose Entresto and torsemide.  Did not tolerate higher dose of Entresto.  Repeat echo to assess LVEF prior to follow-up in 6 months.    Coronary artery disease: Currently stable and asymptomatic.  Continue current medical therapy.      Hyperlipidemia: Continue high intensity statin therapy, goal LDL less than 100,  Ideally less than 70.        Subjective  Stable shortness of breath and a [place] heart class II pattern.  Using a cane for ambulation.  Does have significant underlying COPD.  Blood pressures running less than 130/80 mmHg.  Denies presyncope, syncope.  No PND or orthopnea.  No lower extremity edema.  He is compliant with medical therapy.  He has had recent worsening in his diabetes control with blood sugars running in the 4-500 range, recently started on insulin, primary care physician following closely and titrating appropriately.    PHYSICAL EXAMINATION:  Vitals:    03/18/19 1511   BP: 122/70   BP Location: Left arm   Patient Position: Sitting   Pulse: 73   SpO2: 97%   Weight: 112 kg (248 lb)   Height: 175.3 cm (69\")     General Appearance:    Alert, cooperative, no distress, appears stated age   Head:    Normocephalic, without obvious abnormality, atraumatic   Eyes:    conjunctiva/corneas clear   Nose:   Nares normal, septum midline, mucosa normal, no drainage   Throat:   Lips, teeth and gums normal   Neck:   Supple, symmetrical, trachea midline, no carotid    bruit or JVD   Lungs:     Clear to auscultation bilaterally, respirations unlabored   Chest Wall:    No tenderness or deformity    Heart:    Regular rate and rhythm, S1 and S2 normal, no murmur, rub   or gallop, normal " carotid impulse bilaterally without bruit.   Abdomen:     Soft, non-tender   Extremities:   Extremities normal, atraumatic, no cyanosis or edema   Pulses:   2+ and symmetric all extremities   Skin:   Skin color, texture, turgor normal, no rashes or lesions       Diagnostic Data:  Procedures  Lab Results   Component Value Date    CHLPL 164 04/22/2016    TRIG 668 (H) 04/22/2016    HDL 28 (L) 04/22/2016     Lab Results   Component Value Date    GLUCOSE 239 (H) 04/22/2016    BUN 19 04/22/2016    CREATININE 1.5 (H) 04/22/2016     04/22/2016    K 4.1 04/22/2016     04/22/2016    CO2 28 04/22/2016     Lab Results   Component Value Date    HGBA1C 7.7 (H) 04/22/2016     Lab Results   Component Value Date    WBC 9.28 04/22/2016    HGB 13.8 04/22/2016    HCT 39.2 04/22/2016     04/22/2016       Allergies  No Known Allergies    Current Medications    Current Outpatient Medications:   •  amitriptyline (ELAVIL) 50 MG tablet, Take 50 mg by mouth Every Night., Disp: , Rfl:   •  atorvastatin (LIPITOR) 40 MG tablet, Take 1 tablet by mouth Every Night., Disp: 90 tablet, Rfl: 3  •  buPROPion (WELLBUTRIN) 100 MG tablet, Take 1 tablet by mouth Daily., Disp: , Rfl:   •  carvedilol (COREG) 25 MG tablet, Take 1 tablet by mouth 2 (Two) Times a Day., Disp: 180 tablet, Rfl: 3  •  clonazePAM (KlonoPIN) 0.5 MG tablet, Take 0.5 mg by mouth 2 (two) times a day as needed for seizures., Disp: , Rfl:   •  clopidogrel (PLAVIX) 75 MG tablet, Take 1 tablet by mouth Daily., Disp: 90 tablet, Rfl: 3  •  gabapentin (NEURONTIN) 800 MG tablet, Take 600 mg by mouth As Needed., Disp: , Rfl:   •  glipiZIDE (GLUCOTROL) 10 MG tablet, Take 1 tablet by mouth Daily., Disp: , Rfl:   •  insulin glargine (LANTUS) 100 UNIT/ML injection, Inject  under the skin into the appropriate area as directed Daily., Disp: , Rfl:   •  JANUVIA 50 MG tablet, Take 1 tablet by mouth Daily., Disp: , Rfl:   •  KLOR-CON 10 MEQ CR tablet, Take 1 tablet by mouth Daily.,  Disp: , Rfl:   •  metFORMIN (GLUCOPHAGE) 500 MG tablet, Take 500 mg by mouth 2 (Two) Times a Day With Meals., Disp: , Rfl:   •  nitroglycerin (NITROSTAT) 0.4 MG SL tablet, Place 0.4 mg under the tongue As Needed., Disp: , Rfl:   •  pantoprazole (PROTONIX) 40 MG EC tablet, Take 40 mg by mouth daily., Disp: , Rfl:   •  sacubitril-valsartan (ENTRESTO) 49-51 MG tablet, Take 1 tablet by mouth 2 (Two) Times a Day., Disp: 60 tablet, Rfl: 5  •  torsemide (DEMADEX) 20 MG tablet, Take 1 tablet by mouth Daily., Disp: 90 tablet, Rfl: 3          ROS  Review of Systems   Constitution: Negative.   HENT: Negative.    Eyes: Negative.    Cardiovascular: Positive for orthopnea. Negative for chest pain and leg swelling.   Respiratory: Positive for shortness of breath, sleep disturbances due to breathing and wheezing. Negative for snoring.    Endocrine: Negative.    Hematologic/Lymphatic: Negative.    Skin: Negative.    Musculoskeletal: Negative.    Gastrointestinal: Negative.    Genitourinary: Negative.    Neurological: Positive for dizziness.   Psychiatric/Behavioral: Negative.    Allergic/Immunologic: Negative.        SOCIAL HX  Social History     Socioeconomic History   • Marital status:      Spouse name: Not on file   • Number of children: Not on file   • Years of education: Not on file   • Highest education level: Not on file   Social Needs   • Financial resource strain: Not on file   • Food insecurity - worry: Not on file   • Food insecurity - inability: Not on file   • Transportation needs - medical: Not on file   • Transportation needs - non-medical: Not on file   Occupational History   • Not on file   Tobacco Use   • Smoking status: Former Smoker     Types: Cigarettes     Last attempt to quit: 2016     Years since quittin.2   • Smokeless tobacco: Never Used   Substance and Sexual Activity   • Alcohol use: No   • Drug use: No   • Sexual activity: Defer   Other Topics Concern   • Not on file   Social History  Narrative   • Not on file       FAMILY HX  Family History   Problem Relation Age of Onset   • Heart attack Mother    • Diabetes Father    • Hypertension Father              Osvaldo Mijares III, MD, FACC

## 2019-05-02 ENCOUNTER — TELEPHONE (OUTPATIENT)
Dept: CARDIOLOGY | Facility: CLINIC | Age: 59
End: 2019-05-02

## 2019-07-11 ENCOUNTER — CLINICAL SUPPORT NO REQUIREMENTS (OUTPATIENT)
Dept: CARDIOLOGY | Facility: CLINIC | Age: 59
End: 2019-07-11

## 2019-07-11 DIAGNOSIS — I25.10 CORONARY ARTERY DISEASE INVOLVING NATIVE CORONARY ARTERY, ANGINA PRESENCE UNSPECIFIED, UNSPECIFIED WHETHER NATIVE OR TRANSPLANTED HEART: Primary | ICD-10-CM

## 2019-07-11 DIAGNOSIS — I25.5 ISCHEMIC CARDIOMYOPATHY: ICD-10-CM

## 2019-07-11 DIAGNOSIS — I50.22 CHRONIC SYSTOLIC CONGESTIVE HEART FAILURE (HCC): ICD-10-CM

## 2019-07-11 PROCEDURE — 93296 REM INTERROG EVL PM/IDS: CPT | Performed by: INTERNAL MEDICINE

## 2019-07-11 PROCEDURE — 93295 DEV INTERROG REMOTE 1/2/MLT: CPT | Performed by: INTERNAL MEDICINE

## 2019-08-16 ENCOUNTER — HOSPITAL ENCOUNTER (EMERGENCY)
Facility: HOSPITAL | Age: 59
End: 2019-08-16

## 2019-09-25 ENCOUNTER — HOSPITAL ENCOUNTER (OUTPATIENT)
Dept: CARDIOLOGY | Facility: HOSPITAL | Age: 59
Discharge: HOME | End: 2019-09-25
Admitting: INTERNAL MEDICINE

## 2019-09-25 ENCOUNTER — OFFICE VISIT (OUTPATIENT)
Dept: CARDIOLOGY | Facility: CLINIC | Age: 59
End: 2019-09-25

## 2019-09-25 VITALS
SYSTOLIC BLOOD PRESSURE: 142 MMHG | OXYGEN SATURATION: 95 % | HEART RATE: 86 BPM | DIASTOLIC BLOOD PRESSURE: 80 MMHG | BODY MASS INDEX: 38.74 KG/M2 | WEIGHT: 255.6 LBS | HEIGHT: 68 IN

## 2019-09-25 DIAGNOSIS — I50.22 CHRONIC SYSTOLIC CONGESTIVE HEART FAILURE (HCC): Primary | ICD-10-CM

## 2019-09-25 DIAGNOSIS — E78.5 DYSLIPIDEMIA: ICD-10-CM

## 2019-09-25 DIAGNOSIS — I25.10 CORONARY ARTERY DISEASE INVOLVING NATIVE CORONARY ARTERY, ANGINA PRESENCE UNSPECIFIED, UNSPECIFIED WHETHER NATIVE OR TRANSPLANTED HEART: ICD-10-CM

## 2019-09-25 DIAGNOSIS — I10 ESSENTIAL HYPERTENSION: ICD-10-CM

## 2019-09-25 DIAGNOSIS — I50.22 CHRONIC SYSTOLIC CONGESTIVE HEART FAILURE (HCC): ICD-10-CM

## 2019-09-25 DIAGNOSIS — R06.09 DYSPNEA ON EXERTION: ICD-10-CM

## 2019-09-25 LAB
BH CV ECHO MEAS - AO ROOT AREA (BSA CORRECTED): 1.4
BH CV ECHO MEAS - AO ROOT AREA: 7.5 CM^2
BH CV ECHO MEAS - AO ROOT DIAM: 3.1 CM
BH CV ECHO MEAS - BSA(HAYCOCK): 2.4 M^2
BH CV ECHO MEAS - BSA: 2.3 M^2
BH CV ECHO MEAS - BZI_BMI: 36.6 KILOGRAMS/M^2
BH CV ECHO MEAS - BZI_METRIC_HEIGHT: 175.3 CM
BH CV ECHO MEAS - BZI_METRIC_WEIGHT: 112.5 KG
BH CV ECHO MEAS - EDV(CUBED): 231.8 ML
BH CV ECHO MEAS - EDV(MOD-SP2): 118 ML
BH CV ECHO MEAS - EDV(MOD-SP4): 184 ML
BH CV ECHO MEAS - EDV(TEICH): 190 ML
BH CV ECHO MEAS - EF(CUBED): 49.8 %
BH CV ECHO MEAS - EF(MOD-BP): 19 %
BH CV ECHO MEAS - EF(MOD-SP2): 5.9 %
BH CV ECHO MEAS - EF(MOD-SP4): 30.4 %
BH CV ECHO MEAS - EF(TEICH): 41.1 %
BH CV ECHO MEAS - ESV(CUBED): 116.4 ML
BH CV ECHO MEAS - ESV(MOD-SP2): 111 ML
BH CV ECHO MEAS - ESV(MOD-SP4): 128 ML
BH CV ECHO MEAS - ESV(TEICH): 111.9 ML
BH CV ECHO MEAS - FS: 20.5 %
BH CV ECHO MEAS - IVS/LVPW: 0.96
BH CV ECHO MEAS - IVSD: 1.2 CM
BH CV ECHO MEAS - LA DIMENSION: 4.2 CM
BH CV ECHO MEAS - LA/AO: 1.4
BH CV ECHO MEAS - LAD MAJOR: 5.7 CM
BH CV ECHO MEAS - LAT PEAK E' VEL: 6.4 CM/SEC
BH CV ECHO MEAS - LATERAL E/E' RATIO: 13
BH CV ECHO MEAS - LV DIASTOLIC VOL/BSA (35-75): 81.3 ML/M^2
BH CV ECHO MEAS - LV MASS(C)D: 343.5 GRAMS
BH CV ECHO MEAS - LV MASS(C)DI: 151.8 GRAMS/M^2
BH CV ECHO MEAS - LV SYSTOLIC VOL/BSA (12-30): 56.6 ML/M^2
BH CV ECHO MEAS - LVIDD: 6.1 CM
BH CV ECHO MEAS - LVIDS: 4.9 CM
BH CV ECHO MEAS - LVLD AP2: 9.2 CM
BH CV ECHO MEAS - LVLD AP4: 9.7 CM
BH CV ECHO MEAS - LVLS AP2: 10.1 CM
BH CV ECHO MEAS - LVLS AP4: 9.4 CM
BH CV ECHO MEAS - LVPWD: 1.3 CM
BH CV ECHO MEAS - MED PEAK E' VEL: 9.5 CM/SEC
BH CV ECHO MEAS - MEDIAL E/E' RATIO: 8.7
BH CV ECHO MEAS - MV A MAX VEL: 91.3 CM/SEC
BH CV ECHO MEAS - MV DEC SLOPE: 453.8 CM/SEC^2
BH CV ECHO MEAS - MV DEC TIME: 0.19 SEC
BH CV ECHO MEAS - MV E MAX VEL: 84.9 CM/SEC
BH CV ECHO MEAS - MV E/A: 0.93
BH CV ECHO MEAS - MV P1/2T MAX VEL: 97.7 CM/SEC
BH CV ECHO MEAS - MV P1/2T: 63.1 MSEC
BH CV ECHO MEAS - MVA P1/2T LCG: 2.3 CM^2
BH CV ECHO MEAS - MVA(P1/2T): 3.5 CM^2
BH CV ECHO MEAS - PA ACC SLOPE: 534 CM/SEC^2
BH CV ECHO MEAS - PA ACC TIME: 0.15 SEC
BH CV ECHO MEAS - PA PR(ACCEL): 12.5 MMHG
BH CV ECHO MEAS - RV MAX PG: 1.4 MMHG
BH CV ECHO MEAS - RV V1 MAX: 59.7 CM/SEC
BH CV ECHO MEAS - SI(CUBED): 51 ML/M^2
BH CV ECHO MEAS - SI(MOD-SP2): 3.1 ML/M^2
BH CV ECHO MEAS - SI(MOD-SP4): 24.7 ML/M^2
BH CV ECHO MEAS - SI(TEICH): 34.5 ML/M^2
BH CV ECHO MEAS - SV(CUBED): 115.4 ML
BH CV ECHO MEAS - SV(MOD-SP2): 7 ML
BH CV ECHO MEAS - SV(MOD-SP4): 56 ML
BH CV ECHO MEAS - SV(TEICH): 78 ML
BH CV ECHO MEAS - TAPSE (>1.6): 2.8 CM2
BH CV ECHO MEASUREMENTS AVERAGE E/E' RATIO: 10.68
BH CV XLRA - RV BASE: 3.8 CM
BH CV XLRA - RV LENGTH: 7.2 CM
BH CV XLRA - RV MID: 3 CM
BH CV XLRA - TDI S': 12.1 CM/SEC
LEFT ATRIUM VOLUME INDEX: 18.6 ML/M^2
LEFT ATRIUM VOLUME: 42 ML
MAXIMAL PREDICTED HEART RATE: 160 BPM
STRESS TARGET HR: 136 BPM

## 2019-09-25 PROCEDURE — 93306 TTE W/DOPPLER COMPLETE: CPT | Performed by: INTERNAL MEDICINE

## 2019-09-25 PROCEDURE — 93280 PM DEVICE PROGR EVAL DUAL: CPT | Performed by: INTERNAL MEDICINE

## 2019-09-25 PROCEDURE — 93306 TTE W/DOPPLER COMPLETE: CPT

## 2019-09-25 PROCEDURE — 99214 OFFICE O/P EST MOD 30 MIN: CPT | Performed by: INTERNAL MEDICINE

## 2019-09-25 PROCEDURE — 25010000002 SULFUR HEXAFLUORIDE MICROSPH 60.7-25 MG RECONSTITUTED SUSPENSION: Performed by: INTERNAL MEDICINE

## 2019-09-25 RX ADMIN — SULFUR HEXAFLUORIDE 4 ML: KIT at 13:30

## 2019-09-25 NOTE — PROGRESS NOTES
[facility] at [facility]  Office visit  Pawan Easton  1960  [phone]    VISIT DATE:  9/25/2019      PCP: Osvaldo Vargas DO  [address]  [place] [address]    CC:  Chief Complaint   Patient presents with   • Coronary Artery Disease   • echo f/u       PROBLEM LIST:  1. Coronary artery disease:  a. 12/29/2016, cardiac catheterization for ST elevated myocardial infarction with multiple lesions in the RCA in the mid and into the proximal segment. RV branch had an 80% ostial stenosis; 60% to 70% mid RCA; LAD had subtotal occlusion; circumflex OM was 40%.  b. 12/30/2015, reported placement of a Promus drug-eluting stent.  c. January 2016, cardiac catheterization for acute ST elevated myocardial infarction with placement of a 3.0 x 16 Promus drug-eluting stent to the proximal LAD. EF of 35%.  d. 02/05/2016, cardiac catheterization by Dr. Joey Self which showed an EF of 10% to 15%. Widely patent proximal mid LAD stent. Small jailed 80% to 90% first diagonal (1.5 mm vessel). Diffuse 80% stenosis of the ramus intermedius. 30% RCA and right PDA, and 50% distal LAD.   2. Ischemic cardiomyopathy/class 1-2:  a. 02/05/2016, echocardiogram with an EF of 25% to 30%, mild mitral regurgitation, and mild tricuspid regurgitation.  3. Systolic congestive heart failure:  a. February 2016 admission which required mechanical ventilation.   4. Hypertension.   5. Dyslipidemia.   6. Diabetes.   7. Chronic obstructive pulmonary disease.   8. Obstructive sleep apnea.   9. Obesity.   10. Chronic renal insufficiency.      ASSESSMENT:   Diagnosis Plan   1. Chronic systolic congestive heart failure (CMS/HCC)     2. Coronary artery disease involving native coronary artery, angina presence unspecified, unspecified whether native or transplanted heart     3. Essential hypertension     4. Dyslipidemia       Device interrogation:  [employer] dual-chamber ICD  Less 1% atrial pacing, sensed P-wave 2.2 mV, threshold 1.12 V  "at 0.5 ms, impedance 450 ohms  Less 1% ventricular pacing, sensed R wave greater than 12 mV, threshold 1.25 V at 0.8 ms, impedance 400 ohms  Shock impedance 88  ohms   no arrhythmia  Estimated battery life 4.5-5.9 years    PLAN:  Congestive heart failure, chronic, systolic: [place] heart class 2-3.  Continue carvedilol, moderate dose Entresto and torsemide.  Did not tolerate higher dose of Entresto.  Adding Aldactone 25 mg p.o. daily.  BMP today and in 2 weeks with his primary care physician.  Estimation of EF today approximately 20% with apical akinesis.  Myocardial perfusion imaging pending.  Does not qualify for CRT based on EKG or frequency of RV pacing.    Coronary artery disease: Currently stable and asymptomatic.  Continue current medical therapy.       Hyperlipidemia: Continue high intensity statin therapy, goal LDL less than 100,  Ideally less than 70.        Subjective  Stable shortness of breath and a [place] heart class II pattern.   Does have significant underlying COPD.  Blood pressures running less than 130/80 mmHg.  Denies presyncope, syncope.  No PND or orthopnea.  No lower extremity edema.  He is compliant with medical therapy.  Reviewed transthoracic echo imaging with patient in the office today.  Developed upper respiratory tract infection over the past week.    PHYSICAL EXAMINATION:  Vitals:    09/25/19 1353   BP: 142/80   BP Location: Right arm   Patient Position: Sitting   Pulse: 86   SpO2: 95%   Weight: 116 kg (255 lb 9.6 oz)   Height: 172.7 cm (68\")     General Appearance:    Alert, cooperative, no distress, appears stated age   Head:    Normocephalic, without obvious abnormality, atraumatic   Eyes:    conjunctiva/corneas clear   Nose:   Nares normal, septum midline, mucosa normal, no drainage   Throat:   Lips, teeth and gums normal   Neck:   Supple, symmetrical, trachea midline, no carotid    bruit or JVD   Lungs:     Clear to auscultation bilaterally, respirations unlabored   Chest Wall: "    No tenderness or deformity    Heart:    Regular rate and rhythm, S1 and S2 normal, no murmur, rub   or gallop, normal carotid impulse bilaterally without bruit.   Abdomen:     Soft, non-tender   Extremities:   Extremities normal, atraumatic, no cyanosis or edema   Pulses:   2+ and symmetric all extremities   Skin:   Skin color, texture, turgor normal, no rashes or lesions       Diagnostic Data:    ECG 12 Lead  Date/Time: 9/25/2019 2:24 PM  Performed by: Osvaldo Mijares III, MD  Authorized by: Osvaldo Mijares III, MD   Comparison: compared with previous ECG from 4/22/2016  Similar to previous ECG  Rhythm: sinus rhythm  Conduction: right bundle branch block  Q waves: V1, V2, V3 and V4      Clinical impression: abnormal EKG          Lab Results   Component Value Date    CHLPL 164 04/22/2016    TRIG 668 (H) 04/22/2016    HDL 28 (L) 04/22/2016     Lab Results   Component Value Date    GLUCOSE 239 (H) 04/22/2016    BUN 19 04/22/2016    CREATININE 1.5 (H) 04/22/2016     04/22/2016    K 4.1 04/22/2016     04/22/2016    CO2 28 04/22/2016     Lab Results   Component Value Date    HGBA1C 7.7 (H) 04/22/2016     Lab Results   Component Value Date    WBC 9.28 04/22/2016    HGB 13.8 04/22/2016    HCT 39.2 04/22/2016     04/22/2016       Allergies  No Known Allergies    Current Medications    Current Outpatient Medications:   •  amitriptyline (ELAVIL) 50 MG tablet, Take 50 mg by mouth Every Night., Disp: , Rfl:   •  atorvastatin (LIPITOR) 40 MG tablet, Take 1 tablet by mouth Every Night., Disp: 90 tablet, Rfl: 3  •  buPROPion (WELLBUTRIN) 100 MG tablet, Take 1 tablet by mouth Daily., Disp: , Rfl:   •  carvedilol (COREG) 25 MG tablet, Take 1 tablet by mouth 2 (Two) Times a Day With Meals., Disp: 180 tablet, Rfl: 3  •  clonazePAM (KlonoPIN) 0.5 MG tablet, Take 0.5 mg by mouth 2 (two) times a day as needed for seizures., Disp: , Rfl:   •  clopidogrel (PLAVIX) 75 MG tablet, Take 1 tablet by mouth Daily., Disp: 90  tablet, Rfl: 3  •  gabapentin (NEURONTIN) 800 MG tablet, Take 600 mg by mouth As Needed., Disp: , Rfl:   •  glipiZIDE (GLUCOTROL) 10 MG tablet, Take 1 tablet by mouth Daily., Disp: , Rfl:   •  insulin glargine (LANTUS) 100 UNIT/ML injection, Inject  under the skin into the appropriate area as directed Daily., Disp: , Rfl:   •  JANUVIA 50 MG tablet, Take 1 tablet by mouth Daily., Disp: , Rfl:   •  KLOR-CON 10 MEQ CR tablet, Take 1 tablet by mouth Daily., Disp: , Rfl:   •  metFORMIN (GLUCOPHAGE) 500 MG tablet, Take 500 mg by mouth 2 (Two) Times a Day With Meals., Disp: , Rfl:   •  nitroglycerin (NITROSTAT) 0.4 MG SL tablet, Place 0.4 mg under the tongue As Needed., Disp: , Rfl:   •  pantoprazole (PROTONIX) 40 MG EC tablet, Take 40 mg by mouth daily., Disp: , Rfl:   •  sacubitril-valsartan (ENTRESTO) 49-51 MG tablet, Take 1 tablet by mouth 2 (Two) Times a Day., Disp: 180 tablet, Rfl: 1  •  torsemide (DEMADEX) 20 MG tablet, Take 1 tablet by mouth Daily., Disp: 90 tablet, Rfl: 3          ROS  Review of Systems   Constitution: Negative.   HENT: Negative.    Eyes: Negative.    Cardiovascular: Positive for orthopnea. Negative for chest pain and leg swelling.   Respiratory: Positive for cough, shortness of breath, sleep disturbances due to breathing, snoring and wheezing.    Endocrine: Negative.    Hematologic/Lymphatic: Negative.    Skin: Negative.    Musculoskeletal: Negative.    Gastrointestinal: Negative.    Genitourinary: Negative.    Neurological: Positive for dizziness.   Psychiatric/Behavioral: Negative.    Allergic/Immunologic: Negative.        SOCIAL HX  Social History     Socioeconomic History   • Marital status:      Spouse name: Not on file   • Number of children: Not on file   • Years of education: Not on file   • Highest education level: Not on file   Tobacco Use   • Smoking status: Former Smoker     Types: Cigarettes     Last attempt to quit: 2016     Years since quittin.7   • Smokeless  tobacco: Never Used   Substance and Sexual Activity   • Alcohol use: No   • Drug use: No   • Sexual activity: Defer       FAMILY HX  Family History   Problem Relation Age of Onset   • Heart attack Mother    • Diabetes Father    • Hypertension Father              Osvaldo Mijares III, MD, FACC

## 2019-10-02 ENCOUNTER — HOSPITAL ENCOUNTER (OUTPATIENT)
Dept: CARDIOLOGY | Facility: HOSPITAL | Age: 59
Discharge: HOME | End: 2019-10-02

## 2019-10-02 DIAGNOSIS — R06.09 DYSPNEA ON EXERTION: ICD-10-CM

## 2019-10-02 NOTE — NURSING NOTE
Pt. Arrived today for a Cardiac PET CT scan. When the patient's lungs were assessed wheezes were noted bilaterally. Inhaler given 2 puffs twice, 10 minutes apart. This did not eliminate nor decrease his wheezes. I left a message on Dr. Mijares's nurse Carmel informing that the test was cancelled and that I encouraged him to see his PCP.

## 2019-10-30 ENCOUNTER — CLINICAL SUPPORT NO REQUIREMENTS (OUTPATIENT)
Dept: CARDIOLOGY | Facility: CLINIC | Age: 59
End: 2019-10-30

## 2019-10-30 DIAGNOSIS — I25.5 ISCHEMIC CARDIOMYOPATHY: ICD-10-CM

## 2019-10-30 PROCEDURE — 93296 REM INTERROG EVL PM/IDS: CPT | Performed by: INTERNAL MEDICINE

## 2019-10-30 PROCEDURE — 93295 DEV INTERROG REMOTE 1/2/MLT: CPT | Performed by: INTERNAL MEDICINE

## 2019-10-31 ENCOUNTER — TELEPHONE (OUTPATIENT)
Dept: CARDIOLOGY | Facility: CLINIC | Age: 59
End: 2019-10-31

## 2020-01-14 ENCOUNTER — OFFICE VISIT (OUTPATIENT)
Dept: CARDIOLOGY | Facility: CLINIC | Age: 60
End: 2020-01-14

## 2020-01-14 VITALS
BODY MASS INDEX: 35.99 KG/M2 | HEART RATE: 84 BPM | DIASTOLIC BLOOD PRESSURE: 64 MMHG | WEIGHT: 243 LBS | SYSTOLIC BLOOD PRESSURE: 122 MMHG | HEIGHT: 69 IN | OXYGEN SATURATION: 95 %

## 2020-01-14 DIAGNOSIS — I25.10 CORONARY ARTERY DISEASE INVOLVING NATIVE CORONARY ARTERY OF NATIVE HEART WITHOUT ANGINA PECTORIS: Primary | ICD-10-CM

## 2020-01-14 DIAGNOSIS — I10 ESSENTIAL HYPERTENSION: ICD-10-CM

## 2020-01-14 DIAGNOSIS — E78.5 DYSLIPIDEMIA: ICD-10-CM

## 2020-01-14 DIAGNOSIS — I50.20 SYSTOLIC CONGESTIVE HEART FAILURE, UNSPECIFIED HF CHRONICITY (HCC): ICD-10-CM

## 2020-01-14 PROCEDURE — 93283 PRGRMG EVAL IMPLANTABLE DFB: CPT | Performed by: INTERNAL MEDICINE

## 2020-01-14 PROCEDURE — 99214 OFFICE O/P EST MOD 30 MIN: CPT | Performed by: INTERNAL MEDICINE

## 2020-01-14 RX ORDER — ALBUTEROL SULFATE 90 UG/1
2 AEROSOL, METERED RESPIRATORY (INHALATION) EVERY 6 HOURS PRN
COMMUNITY
Start: 2019-12-22

## 2020-01-14 RX ORDER — MECLIZINE HYDROCHLORIDE 25 MG/1
TABLET ORAL
COMMUNITY
Start: 2019-11-30

## 2020-01-14 RX ORDER — CLOPIDOGREL BISULFATE 75 MG/1
75 TABLET ORAL DAILY
Qty: 90 TABLET | Refills: 3 | Status: SHIPPED | OUTPATIENT
Start: 2020-01-14

## 2020-01-14 RX ORDER — ATORVASTATIN CALCIUM 40 MG/1
40 TABLET, FILM COATED ORAL NIGHTLY
Qty: 90 TABLET | Refills: 3 | Status: SHIPPED | OUTPATIENT
Start: 2020-01-14

## 2020-01-14 RX ORDER — TORSEMIDE 20 MG/1
20 TABLET ORAL DAILY
Qty: 90 TABLET | Refills: 3 | Status: SHIPPED | OUTPATIENT
Start: 2020-01-14

## 2020-01-14 RX ORDER — DAPAGLIFLOZIN 10 MG/1
1 TABLET, FILM COATED ORAL DAILY
COMMUNITY
Start: 2019-12-26

## 2020-01-14 RX ORDER — CARVEDILOL 25 MG/1
25 TABLET ORAL 2 TIMES DAILY WITH MEALS
Qty: 180 TABLET | Refills: 3 | Status: SHIPPED | OUTPATIENT
Start: 2020-01-14

## 2020-01-14 RX ORDER — TAMSULOSIN HYDROCHLORIDE 0.4 MG/1
1 CAPSULE ORAL DAILY
COMMUNITY
Start: 2019-11-25

## 2020-01-14 NOTE — PROGRESS NOTES
[facility] at [facility]  Office visit  Pawan Easton  1960  [phone]    VISIT DATE:  1/14/2020      PCP: Osvaldo Vargas DO  [address]  [place] [address]    CC:  Chief Complaint   Patient presents with   • Coronary Artery Disease   • Dizziness   • Shortness of Breath   • Fatigue       PROBLEM LIST:  1. Coronary artery disease:  a. 12/29/2016, cardiac catheterization for ST elevated myocardial infarction with multiple lesions in the RCA in the mid and into the proximal segment. RV branch had an 80% ostial stenosis; 60% to 70% mid RCA; LAD had subtotal occlusion; circumflex OM was 40%.  b. 12/30/2015, reported placement of a Promus drug-eluting stent.  c. January 2016, cardiac catheterization for acute ST elevated myocardial infarction with placement of a 3.0 x 16 Promus drug-eluting stent to the proximal LAD. EF of 35%.  d. 02/05/2016, cardiac catheterization by Dr. Joey Self which showed an EF of 10% to 15%. Widely patent proximal mid LAD stent. Small jailed 80% to 90% first diagonal (1.5 mm vessel). Diffuse 80% stenosis of the ramus intermedius. 30% RCA and right PDA, and 50% distal LAD.   2. Ischemic cardiomyopathy/class 1-2:  a. 02/05/2016, echocardiogram with an EF of 25% to 30%, mild mitral regurgitation, and mild tricuspid regurgitation.  3. Systolic congestive heart failure:  a. February 2016 admission which required mechanical ventilation.   4. Hypertension.   5. Dyslipidemia.   6. Diabetes.   7. Chronic obstructive pulmonary disease.   8. Obstructive sleep apnea.   9. Obesity.   10. Chronic renal insufficiency.      ASSESSMENT:   Diagnosis Plan   1. Coronary artery disease involving native coronary artery of native heart without angina pectoris     2. Systolic congestive heart failure, unspecified HF chronicity (CMS/[facility])     3. Essential hypertension     4. Dyslipidemia       Device interrogation:  [employer] dual-chamber ICD  Less 1% atrial pacing, sensed P-wave 2.2 mV,  "threshold 1.25 V at 0.5 ms, impedance 480 ohms  Less 1% ventricular pacing, sensed R wave greater than 12 mV, threshold 1.25 V at 0.8 ms, impedance 610 ohms  Shock impedance 86  ohms   no arrhythmia  Estimated battery life 4.5-5.6 years    PLAN:  Congestive heart failure, chronic, systolic: [place] heart class 2-3.  Continue carvedilol, moderate dose Entresto and torsemide.  Did not tolerate higher dose of Entresto.   Does not qualify for CRT based on EKG or frequency of RV pacing.  Currently euvolemic and compensated.    Coronary artery disease: Currently stable and asymptomatic.  Continue current medical therapy.       Hyperlipidemia: Continue high intensity statin therapy, goal LDL less than 100,  Ideally less than 70.        Subjective  Stable shortness of breath in a [place] heart class II pattern.   Does have significant underlying COPD.  Blood pressures running less than 130/80 mmHg.  Denies presyncope, syncope.  No PND or orthopnea.  No lower extremity edema.  He is compliant with medical therapy.  Mainly complains of increased fatigue and daytime somnolence.  Current non-smoker.    PHYSICAL EXAMINATION:  Vitals:    01/14/20 1349   BP: 122/64   BP Location: Right arm   Patient Position: Sitting   Pulse: 84   SpO2: 95%   Weight: 110 kg (243 lb)   Height: 175.3 cm (69\")     General Appearance:    Alert, cooperative, no distress, appears stated age   Head:    Normocephalic, without obvious abnormality, atraumatic   Eyes:    conjunctiva/corneas clear   Nose:   Nares normal, septum midline, mucosa normal, no drainage   Throat:   Lips, teeth and gums normal   Neck:   Supple, symmetrical, trachea midline, no carotid    bruit or JVD   Lungs:     Clear to auscultation bilaterally, respirations unlabored   Chest Wall:    No tenderness or deformity    Heart:    Regular rate and rhythm, S1 and S2 normal, no murmur, rub   or gallop, normal carotid impulse bilaterally without bruit.   Abdomen:     Soft, non-tender "   Extremities:   Extremities normal, atraumatic, no cyanosis or edema   Pulses:   2+ and symmetric all extremities   Skin:   Skin color, texture, turgor normal, no rashes or lesions       Diagnostic Data:  Procedures  Lab Results   Component Value Date    CHLPL 164 04/22/2016    TRIG 668 (H) 04/22/2016    HDL 28 (L) 04/22/2016     Lab Results   Component Value Date    GLUCOSE 239 (H) 04/22/2016    BUN 19 04/22/2016    CREATININE 1.5 (H) 04/22/2016     04/22/2016    K 4.1 04/22/2016     04/22/2016    CO2 28 04/22/2016     Lab Results   Component Value Date    HGBA1C 7.7 (H) 04/22/2016     Lab Results   Component Value Date    WBC 9.28 04/22/2016    HGB 13.8 04/22/2016    HCT 39.2 04/22/2016     04/22/2016       Allergies  No Known Allergies    Current Medications    Current Outpatient Medications:   •  amitriptyline (ELAVIL) 50 MG tablet, Take 50 mg by mouth Every Night., Disp: , Rfl:   •  atorvastatin (LIPITOR) 40 MG tablet, Take 1 tablet by mouth Every Night., Disp: 90 tablet, Rfl: 3  •  buPROPion (WELLBUTRIN) 100 MG tablet, Take 1 tablet by mouth Daily., Disp: , Rfl:   •  carvedilol (COREG) 25 MG tablet, Take 1 tablet by mouth 2 (Two) Times a Day With Meals., Disp: 180 tablet, Rfl: 3  •  clonazePAM (KlonoPIN) 0.5 MG tablet, Take 0.5 mg by mouth 2 (two) times a day as needed for seizures., Disp: , Rfl:   •  clopidogrel (PLAVIX) 75 MG tablet, Take 1 tablet by mouth Daily., Disp: 90 tablet, Rfl: 3  •  FARXIGA 10 MG tablet, Take 1 tablet by mouth Daily., Disp: , Rfl:   •  gabapentin (NEURONTIN) 800 MG tablet, Take 600 mg by mouth As Needed., Disp: , Rfl:   •  glipiZIDE (GLUCOTROL) 10 MG tablet, Take 1 tablet by mouth Daily., Disp: , Rfl:   •  insulin glargine (LANTUS) 100 UNIT/ML injection, Inject 4 Units under the skin into the appropriate area as directed Daily., Disp: , Rfl:   •  JANUVIA 50 MG tablet, Take 1 tablet by mouth Daily., Disp: , Rfl:   •  KLOR-CON 10 MEQ CR tablet, Take 1 tablet by  mouth Daily., Disp: , Rfl:   •  meclizine (ANTIVERT) 25 MG tablet, TAKE 1 TABLET BY MOUTH THREE TIMES DAILY AS NEEDED FOR 30 DAYS, Disp: , Rfl:   •  metFORMIN (GLUCOPHAGE) 500 MG tablet, Take 500 mg by mouth 2 (Two) Times a Day With Meals., Disp: , Rfl:   •  nitroglycerin (NITROSTAT) 0.4 MG SL tablet, Place 0.4 mg under the tongue As Needed., Disp: , Rfl:   •  pantoprazole (PROTONIX) 40 MG EC tablet, Take 40 mg by mouth daily., Disp: , Rfl:   •  sacubitril-valsartan (ENTRESTO) 49-51 MG tablet, Take 1 tablet by mouth 2 (Two) Times a Day., Disp: 180 tablet, Rfl: 1  •  tamsulosin (FLOMAX) 0.4 MG capsule 24 hr capsule, Take 1 capsule by mouth Daily., Disp: , Rfl:   •  torsemide (DEMADEX) 20 MG tablet, Take 1 tablet by mouth Daily., Disp: 90 tablet, Rfl: 3  •  VENTOLIN  (90 Base) MCG/ACT inhaler, Inhale 2 puffs Every 6 (Six) Hours As Needed., Disp: , Rfl:           ROS  Review of Systems   Constitution: Positive for malaise/fatigue.   HENT: Negative.    Eyes: Negative.    Cardiovascular: Negative for chest pain and leg swelling.   Respiratory: Negative for cough, shortness of breath, sleep disturbances due to breathing, snoring and wheezing.    Endocrine: Negative.    Hematologic/Lymphatic: Negative.    Skin: Negative.    Musculoskeletal: Negative.    Gastrointestinal: Negative.    Genitourinary: Negative.    Neurological: Positive for dizziness.   Psychiatric/Behavioral: Negative.    Allergic/Immunologic: Negative.        SOCIAL HX  Social History     Socioeconomic History   • Marital status:      Spouse name: Not on file   • Number of children: Not on file   • Years of education: Not on file   • Highest education level: Not on file   Tobacco Use   • Smoking status: Former Smoker     Packs/day: 1.00     Types: Cigarettes     Last attempt to quit: 12/16/2016     Years since quitting: 3.0   • Smokeless tobacco: Never Used   Substance and Sexual Activity   • Alcohol use: No   • Drug use: No   • Sexual  activity: Defer       FAMILY HX  Family History   Problem Relation Age of Onset   • Heart attack Mother    • Diabetes Father    • Hypertension Father              Osvaldo Mijares III, MD, FACC

## 2020-01-15 ENCOUNTER — APPOINTMENT (OUTPATIENT)
Dept: CARDIOLOGY | Facility: HOSPITAL | Age: 60
End: 2020-01-15

## 2020-03-17 ENCOUNTER — CLINICAL SUPPORT NO REQUIREMENTS (OUTPATIENT)
Dept: CARDIOLOGY | Facility: CLINIC | Age: 60
End: 2020-03-17

## 2020-03-17 DIAGNOSIS — I25.5 ISCHEMIC CARDIOMYOPATHY: ICD-10-CM

## 2020-03-17 PROCEDURE — 93296 REM INTERROG EVL PM/IDS: CPT | Performed by: INTERNAL MEDICINE

## 2020-03-17 PROCEDURE — 93295 DEV INTERROG REMOTE 1/2/MLT: CPT | Performed by: INTERNAL MEDICINE

## 2020-06-16 ENCOUNTER — TELEPHONE (OUTPATIENT)
Dept: CARDIOLOGY | Facility: CLINIC | Age: 60
End: 2020-06-16

## 2020-06-16 NOTE — TELEPHONE ENCOUNTER
Left message with daughter for Mr Easton to return my call regarding his monitor not transmitting. I will have him send a manual transmission.

## 2020-07-09 ENCOUNTER — TELEPHONE (OUTPATIENT)
Dept: CARDIOLOGY | Facility: CLINIC | Age: 60
End: 2020-07-09

## 2020-07-09 NOTE — TELEPHONE ENCOUNTER
Was transferred a phone call from daughter and she said that her dad hasn't been feeling well and wanted to get a reading from his pacemaker.  Monitor isn't connecting and unable to get to transmit.  He is going to call [employer] to see what is going on with monitor.  He said he hurt around his pacemaker the other day and was SOA and legs and feet hurt.  He has a f/u appt with Dr Mijares on 12th.  He will let us know what is going on with the monitor.

## 2020-07-14 ENCOUNTER — OFFICE VISIT (OUTPATIENT)
Dept: CARDIOLOGY | Facility: CLINIC | Age: 60
End: 2020-07-14

## 2020-07-14 VITALS
WEIGHT: 235 LBS | HEIGHT: 69 IN | SYSTOLIC BLOOD PRESSURE: 110 MMHG | TEMPERATURE: 98.2 F | BODY MASS INDEX: 34.8 KG/M2 | DIASTOLIC BLOOD PRESSURE: 70 MMHG | HEART RATE: 78 BPM | OXYGEN SATURATION: 96 %

## 2020-07-14 DIAGNOSIS — E78.5 DYSLIPIDEMIA: ICD-10-CM

## 2020-07-14 DIAGNOSIS — I50.22 CHRONIC SYSTOLIC CONGESTIVE HEART FAILURE (HCC): ICD-10-CM

## 2020-07-14 DIAGNOSIS — I25.10 CORONARY ARTERY DISEASE INVOLVING NATIVE CORONARY ARTERY OF NATIVE HEART WITHOUT ANGINA PECTORIS: Primary | ICD-10-CM

## 2020-07-14 DIAGNOSIS — I10 ESSENTIAL HYPERTENSION: ICD-10-CM

## 2020-07-14 PROCEDURE — 99214 OFFICE O/P EST MOD 30 MIN: CPT | Performed by: INTERNAL MEDICINE

## 2020-07-14 PROCEDURE — 93283 PRGRMG EVAL IMPLANTABLE DFB: CPT | Performed by: INTERNAL MEDICINE

## 2020-07-14 NOTE — PROGRESS NOTES
[facility] at [facility]  Office visit  Pawan Easton  1960  [phone]    VISIT DATE:  7/14/2020      PCP: Osvaldo Vargas DO  [address]  [place] [address]    CC:  Chief Complaint   Patient presents with   • Coronary Artery Disease       PROBLEM LIST:  1. Coronary artery disease:  a. 12/29/2016, cardiac catheterization for ST elevated myocardial infarction with multiple lesions in the RCA in the mid and into the proximal segment. RV branch had an 80% ostial stenosis; 60% to 70% mid RCA; LAD had subtotal occlusion; circumflex OM was 40%.  b. 12/30/2015, reported placement of a Promus drug-eluting stent.  c. January 2016, cardiac catheterization for acute ST elevated myocardial infarction with placement of a 3.0 x 16 Promus drug-eluting stent to the proximal LAD. EF of 35%.  d. 02/05/2016, cardiac catheterization by Dr. Joey Self which showed an EF of 10% to 15%. Widely patent proximal mid LAD stent. Small jailed 80% to 90% first diagonal (1.5 mm vessel). Diffuse 80% stenosis of the ramus intermedius. 30% RCA and right PDA, and 50% distal LAD.   2. Ischemic cardiomyopathy/class 1-2:  a. 02/05/2016, echocardiogram with an EF of 25% to 30%, mild mitral regurgitation, and mild tricuspid regurgitation.  3. Systolic congestive heart failure:  a. February 2016 admission which required mechanical ventilation.   4. Hypertension.   5. Dyslipidemia.   6. Diabetes.   7. Chronic obstructive pulmonary disease.   8. Obstructive sleep apnea.   9. Obesity.   10. Chronic renal insufficiency.      ASSESSMENT:   Diagnosis Plan   1. Coronary artery disease involving native coronary artery of native heart without angina pectoris     2. Chronic systolic congestive heart failure (CMS/[facility])     3. Essential hypertension     4. Dyslipidemia       Device interrogation:  [employer] dual-chamber ICD  Less 1% atrial pacing, sensed P-wave 1.1 mV, threshold 1.25 V at 0.5 ms, impedance 380 ohms  Less 1% ventricular  "pacing, sensed R wave greater than 12 mV, threshold 1.25 V at 0.8 ms, impedance 550 ohms  Shock impedance 82 ohms   no arrhythmia  Estimated battery life 4-5.2 years    PLAN:  Congestive heart failure, chronic, systolic: [place] heart class 2-3.  Continue carvedilol, moderate dose Entresto and torsemide.  Did not tolerate higher dose of Entresto.   Does not qualify for CRT based on EKG or frequency of RV pacing.  Currently euvolemic and compensated.    Coronary artery disease: Currently stable and asymptomatic.  Continue current medical therapy.       Hyperlipidemia: Continue high intensity statin therapy, goal LDL less than 100,  Ideally less than 70.        Subjective  Stable shortness of breath in a [place] heart class II pattern.   Does have significant underlying COPD.  Blood pressures running less than 130/80 mmHg.  Denies presyncope, syncope.  No PND or orthopnea.  No lower extremity edema.  He is compliant with medical therapy.   Current non-smoker.  Reporting worsening bilateral lower extremity diabetic neuropathy.  Now also having visual changes as well.  Poorly controlled diabetes, his last 2 hemoglobin A1c's were 13.1 and 13.4.  Does report drinking regular [employer] daily.  Discussed importance of adhering to a diabetic diet.    PHYSICAL EXAMINATION:  Vitals:    07/14/20 1350   Height: 175.3 cm (69\")     General Appearance:    Alert, cooperative, no distress, appears stated age   Head:    Normocephalic, without obvious abnormality, atraumatic   Eyes:    conjunctiva/corneas clear   Nose:   Nares normal, septum midline, mucosa normal, no drainage   Throat:   Lips, teeth and gums normal   Neck:   Supple, symmetrical, trachea midline, no carotid    bruit or JVD   Lungs:     Clear to auscultation bilaterally, respirations unlabored   Chest Wall:    No tenderness or deformity    Heart:    Regular rate and rhythm, S1 and S2 normal, no murmur, rub   or gallop, normal carotid impulse bilaterally without bruit. "   Abdomen:     Soft, non-tender   Extremities:   Extremities normal, atraumatic, no cyanosis or edema   Pulses:   2+ and symmetric all extremities   Skin:   Skin color, texture, turgor normal, no rashes or lesions       Diagnostic Data:  Procedures  Lab Results   Component Value Date    CHLPL 164 04/22/2016    TRIG 668 (H) 04/22/2016    HDL 28 (L) 04/22/2016     Lab Results   Component Value Date    GLUCOSE 239 (H) 04/22/2016    BUN 19 04/22/2016    CREATININE 1.5 (H) 04/22/2016     04/22/2016    K 4.1 04/22/2016     04/22/2016    CO2 28 04/22/2016     Lab Results   Component Value Date    HGBA1C 7.7 (H) 04/22/2016     Lab Results   Component Value Date    WBC 9.28 04/22/2016    HGB 13.8 04/22/2016    HCT 39.2 04/22/2016     04/22/2016       Allergies  No Known Allergies    Current Medications    Current Outpatient Medications:   •  amitriptyline (ELAVIL) 50 MG tablet, Take 50 mg by mouth Every Night., Disp: , Rfl:   •  atorvastatin (LIPITOR) 40 MG tablet, Take 1 tablet by mouth Every Night., Disp: 90 tablet, Rfl: 3  •  buPROPion (WELLBUTRIN) 100 MG tablet, Take 1 tablet by mouth Daily., Disp: , Rfl:   •  carvedilol (COREG) 25 MG tablet, Take 1 tablet by mouth 2 (Two) Times a Day With Meals., Disp: 180 tablet, Rfl: 3  •  clonazePAM (KlonoPIN) 0.5 MG tablet, Take 0.5 mg by mouth 2 (two) times a day as needed for seizures., Disp: , Rfl:   •  clopidogrel (PLAVIX) 75 MG tablet, Take 1 tablet by mouth Daily., Disp: 90 tablet, Rfl: 3  •  FARXIGA 10 MG tablet, Take 1 tablet by mouth Daily., Disp: , Rfl:   •  gabapentin (NEURONTIN) 800 MG tablet, Take 600 mg by mouth As Needed., Disp: , Rfl:   •  glipiZIDE (GLUCOTROL) 10 MG tablet, Take 1 tablet by mouth Daily., Disp: , Rfl:   •  insulin glargine (LANTUS) 100 UNIT/ML injection, Inject 4 Units under the skin into the appropriate area as directed Daily., Disp: , Rfl:   •  JANUVIA 50 MG tablet, Take 1 tablet by mouth Daily., Disp: , Rfl:   •  KLOR-CON 10 MEQ  CR tablet, Take 1 tablet by mouth Daily., Disp: , Rfl:   •  meclizine (ANTIVERT) 25 MG tablet, TAKE 1 TABLET BY MOUTH THREE TIMES DAILY AS NEEDED FOR 30 DAYS, Disp: , Rfl:   •  metFORMIN (GLUCOPHAGE) 500 MG tablet, Take 500 mg by mouth 2 (Two) Times a Day With Meals., Disp: , Rfl:   •  nitroglycerin (NITROSTAT) 0.4 MG SL tablet, Place 0.4 mg under the tongue As Needed., Disp: , Rfl:   •  pantoprazole (PROTONIX) 40 MG EC tablet, Take 40 mg by mouth daily., Disp: , Rfl:   •  sacubitril-valsartan (ENTRESTO) 49-51 MG tablet, Take 1 tablet by mouth 2 (Two) Times a Day., Disp: 180 tablet, Rfl: 1  •  tamsulosin (FLOMAX) 0.4 MG capsule 24 hr capsule, Take 1 capsule by mouth Daily., Disp: , Rfl:   •  torsemide (DEMADEX) 20 MG tablet, Take 1 tablet by mouth Daily., Disp: 90 tablet, Rfl: 3  •  VENTOLIN  (90 Base) MCG/ACT inhaler, Inhale 2 puffs Every 6 (Six) Hours As Needed., Disp: , Rfl:           ROS  Review of Systems   Constitution: Positive for malaise/fatigue.   HENT: Negative.    Eyes: Negative.    Cardiovascular: Negative for chest pain and leg swelling.   Respiratory: Negative for cough, shortness of breath, sleep disturbances due to breathing, snoring and wheezing.    Endocrine: Negative.    Hematologic/Lymphatic: Negative.    Skin: Negative.    Musculoskeletal: Negative.    Gastrointestinal: Negative.    Genitourinary: Negative.    Neurological: Positive for dizziness.   Psychiatric/Behavioral: Negative.    Allergic/Immunologic: Negative.        SOCIAL HX  Social History     Socioeconomic History   • Marital status:      Spouse name: Not on file   • Number of children: Not on file   • Years of education: Not on file   • Highest education level: Not on file   Tobacco Use   • Smoking status: Former Smoker     Packs/day: 1.00     Types: Cigarettes     Last attempt to quit: 12/16/2016     Years since quitting: 3.5   • Smokeless tobacco: Never Used   Substance and Sexual Activity   • Alcohol use: No   •  Drug use: No   • Sexual activity: Defer       FAMILY HX  Family History   Problem Relation Age of Onset   • Heart attack Mother    • Diabetes Father    • Hypertension Father              Osvaldo Mijares III, MD, FACC

## 2020-09-15 ENCOUNTER — TELEPHONE (OUTPATIENT)
Dept: CARDIOLOGY | Facility: CLINIC | Age: 60
End: 2020-09-15

## 2020-11-20 ENCOUNTER — TELEPHONE (OUTPATIENT)
Dept: CARDIOLOGY | Facility: CLINIC | Age: 60
End: 2020-11-20

## 2020-12-15 ENCOUNTER — TELEPHONE (OUTPATIENT)
Dept: CARDIOLOGY | Facility: CLINIC | Age: 60
End: 2020-12-15

## 2020-12-15 NOTE — TELEPHONE ENCOUNTER
Called to ask pt if he contacted [facility] support for assistance with his home monitor. No answer and could not leave message.

## 2021-06-17 ENCOUNTER — OFFICE VISIT (OUTPATIENT)
Dept: CARDIOLOGY | Facility: CLINIC | Age: 61
End: 2021-06-17

## 2021-06-17 VITALS
BODY MASS INDEX: 33.86 KG/M2 | HEIGHT: 69 IN | OXYGEN SATURATION: 99 % | DIASTOLIC BLOOD PRESSURE: 84 MMHG | WEIGHT: 228.6 LBS | HEART RATE: 71 BPM | SYSTOLIC BLOOD PRESSURE: 122 MMHG

## 2021-06-17 DIAGNOSIS — I50.22 CHRONIC SYSTOLIC CONGESTIVE HEART FAILURE (HCC): ICD-10-CM

## 2021-06-17 DIAGNOSIS — I25.10 CORONARY ARTERY DISEASE INVOLVING NATIVE CORONARY ARTERY OF NATIVE HEART WITHOUT ANGINA PECTORIS: Primary | ICD-10-CM

## 2021-06-17 DIAGNOSIS — R07.2 PRECORDIAL PAIN: ICD-10-CM

## 2021-06-17 DIAGNOSIS — I10 ESSENTIAL HYPERTENSION: ICD-10-CM

## 2021-06-17 DIAGNOSIS — E78.5 DYSLIPIDEMIA: ICD-10-CM

## 2021-06-17 PROCEDURE — 93283 PRGRMG EVAL IMPLANTABLE DFB: CPT | Performed by: INTERNAL MEDICINE

## 2021-06-17 PROCEDURE — 99214 OFFICE O/P EST MOD 30 MIN: CPT | Performed by: INTERNAL MEDICINE

## 2021-06-17 RX ORDER — LORATADINE 10 MG/1
10 CAPSULE, LIQUID FILLED ORAL DAILY
COMMUNITY

## 2021-06-17 NOTE — PROGRESS NOTES
[facility] at [facility]  Office visit  Pawan Easton  1960  [phone]    VISIT DATE:  6/17/2021      PCP: Osvaldo Vargas DO  [address]  [place] [address]    CC:  Chief Complaint   Patient presents with   • Coronary Artery Disease       PROBLEM LIST:  1. Coronary artery disease:  a. 12/29/2016, cardiac catheterization for ST elevated myocardial infarction with multiple lesions in the RCA in the mid and into the proximal segment. RV branch had an 80% ostial stenosis; 60% to 70% mid RCA; LAD had subtotal occlusion; circumflex OM was 40%.  b. 12/30/2015, reported placement of a Promus drug-eluting stent.  c. January 2016, cardiac catheterization for acute ST elevated myocardial infarction with placement of a 3.0 x 16 Promus drug-eluting stent to the proximal LAD. EF of 35%.  d. 02/05/2016, cardiac catheterization by Dr. Joey Self which showed an EF of 10% to 15%. Widely patent proximal mid LAD stent. Small jailed 80% to 90% first diagonal (1.5 mm vessel). Diffuse 80% stenosis of the ramus intermedius. 30% RCA and right PDA, and 50% distal LAD.   2. Ischemic cardiomyopathy/class 1-2:  a. 02/05/2016, echocardiogram with an EF of 25% to 30%, mild mitral regurgitation, and mild tricuspid regurgitation.  3. Systolic congestive heart failure:  a. February 2016 admission which required mechanical ventilation.   4. Hypertension.   5. Dyslipidemia.   6. Diabetes.   7. Chronic obstructive pulmonary disease.   8. Obstructive sleep apnea.   9. Obesity.   10. Chronic renal insufficiency.      ASSESSMENT:   Diagnosis Plan   1. Coronary artery disease involving native coronary artery of native heart without angina pectoris     2. Dyslipidemia     3. Essential hypertension     4. Chronic systolic congestive heart failure (CMS/HCC)       Device interrogation:  [employer] dual-chamber ICD  Less 1% atrial pacing, sensed P-wave 1.3 mV, threshold 1.12 V at 0.5 ms, impedance 400 ohms  Less 1% ventricular  "pacing, sensed R wave greater than 12 mV, threshold 1.5 V at 0.8 ms, impedance 490 ohms  NSVT x1 6 seconds, PAT x1, episode of A. fib lasting 20 minutes.  Estimated battery life 3.5-4.5 years    PLAN:  Congestive heart failure, chronic, systolic: [place] heart class 2-3.  Continue carvedilol, moderate dose Entresto and torsemide.  Did not tolerate higher dose of Entresto.   Does not qualify for CRT based on EKG or frequency of RV pacing.  Transthoracic echo pending to reassess underlying myocardial structure and function.    Coronary artery disease: Atypical angina.  Continue current medical therapy.  PET myocardial perfusion imaging pending for ischemia evaluation.    Hyperlipidemia: Continue high intensity statin therapy, goal LDL less than 100,  Ideally less than 70.      Paroxysmal atrial fibrillation: Limited burden of arrhythmia.  Not recommending initiation of anticoagulation at this time.    Subjective  Slight worsening in functional capacity, shortness of breath in a class II-III pattern.   Does have significant underlying COPD.  Blood pressures running less than 130/80 mmHg.  Denies presyncope, syncope.  No PND or orthopnea.  No lower extremity edema.  He is compliant with medical therapy.   Current non-smoker.  Describes generalized fatigue and malaise.  Intermittent right-sided chest discomfort, has a sensation of a lump on his chest wall.  Status post recent CT chest and abdomen, results pending.  Also previously for cough and needle will give himself an insulin shot in the subcutaneous tissues of his right upper abdominal quadrant.    PHYSICAL EXAMINATION:  Vitals:    06/17/21 1455   BP: 122/84   BP Location: Left arm   Patient Position: Sitting   Pulse: 71   SpO2: 99%   Weight: 104 kg (228 lb 9.6 oz)   Height: 175.3 cm (69\")     General Appearance:    Alert, cooperative, no distress, appears stated age   Head:    Normocephalic, without obvious abnormality, atraumatic   Eyes:    conjunctiva/corneas " clear   Nose:   Nares normal, septum midline, mucosa normal, no drainage   Throat:   Lips, teeth and gums normal   Neck:   Supple, symmetrical, trachea midline, no carotid    bruit or JVD   Lungs:     Clear to auscultation bilaterally, respirations unlabored   Chest Wall:    No tenderness or deformity    Heart:    Regular rate and rhythm, S1 and S2 normal, no murmur, rub   or gallop, normal carotid impulse bilaterally without bruit.   Abdomen:     Soft, non-tender   Extremities:   Extremities normal, atraumatic, no cyanosis or edema   Pulses:   2+ and symmetric all extremities   Skin:   Skin color, texture, turgor normal, no rashes or lesions       Diagnostic Data:    ECG 12 Lead    Date/Time: 6/17/2021 3:15 PM  Performed by: Osvaldo Mijares III, MD  Authorized by: Osvaldo Mijares III, MD   Comparison: compared with previous ECG from 9/19/2019  Similar to previous ECG  Rhythm: sinus rhythm  Conduction: right bundle branch block  Q waves: V1, V2, V3 and V4      Clinical impression: abnormal EKG          Lab Results   Component Value Date    CHLPL 164 04/22/2016    TRIG 668 (H) 04/22/2016    HDL 28 (L) 04/22/2016     Lab Results   Component Value Date    GLUCOSE 239 (H) 04/22/2016    BUN 19 04/22/2016    CREATININE 1.5 (H) 04/22/2016     04/22/2016    K 4.1 04/22/2016     04/22/2016    CO2 28 04/22/2016     Lab Results   Component Value Date    HGBA1C 7.7 (H) 04/22/2016     Lab Results   Component Value Date    WBC 9.28 04/22/2016    HGB 13.8 04/22/2016    HCT 39.2 04/22/2016     04/22/2016       Allergies  No Known Allergies    Current Medications    Current Outpatient Medications:   •  amitriptyline (ELAVIL) 50 MG tablet, Take 50 mg by mouth Every Night., Disp: , Rfl:   •  atorvastatin (LIPITOR) 40 MG tablet, Take 1 tablet by mouth Every Night., Disp: 90 tablet, Rfl: 3  •  buPROPion (WELLBUTRIN) 100 MG tablet, Take 1 tablet by mouth Daily., Disp: , Rfl:   •  carvedilol (COREG) 25 MG tablet, Take 1  tablet by mouth 2 (Two) Times a Day With Meals., Disp: 180 tablet, Rfl: 3  •  clonazePAM (KlonoPIN) 0.5 MG tablet, Take 0.5 mg by mouth 2 (two) times a day as needed for seizures., Disp: , Rfl:   •  clopidogrel (PLAVIX) 75 MG tablet, Take 1 tablet by mouth Daily., Disp: 90 tablet, Rfl: 3  •  FARXIGA 10 MG tablet, Take 1 tablet by mouth Daily., Disp: , Rfl:   •  gabapentin (NEURONTIN) 800 MG tablet, Take 800 mg by mouth 3 (Three) Times a Day., Disp: , Rfl:   •  glipiZIDE (GLUCOTROL) 10 MG tablet, Take 1 tablet by mouth Daily., Disp: , Rfl:   •  insulin glargine (LANTUS) 100 UNIT/ML injection, Inject 4 Units under the skin into the appropriate area as directed Daily., Disp: , Rfl:   •  JANUVIA 50 MG tablet, Take 1 tablet by mouth Daily., Disp: , Rfl:   •  KLOR-CON 10 MEQ CR tablet, Take 1 tablet by mouth Daily., Disp: , Rfl:   •  Loratadine (Claritin) 10 MG capsule, Take 10 mg by mouth Daily., Disp: , Rfl:   •  meclizine (ANTIVERT) 25 MG tablet, TAKE 1 TABLET BY MOUTH THREE TIMES DAILY AS NEEDED FOR 30 DAYS, Disp: , Rfl:   •  metFORMIN (GLUCOPHAGE) 500 MG tablet, Take 500 mg by mouth 2 (Two) Times a Day With Meals., Disp: , Rfl:   •  nitroglycerin (NITROSTAT) 0.4 MG SL tablet, Place 0.4 mg under the tongue As Needed., Disp: , Rfl:   •  pantoprazole (PROTONIX) 40 MG EC tablet, Take 40 mg by mouth daily., Disp: , Rfl:   •  sacubitril-valsartan (ENTRESTO) 49-51 MG tablet, Take 1 tablet by mouth 2 (Two) Times a Day., Disp: 180 tablet, Rfl: 1  •  tamsulosin (FLOMAX) 0.4 MG capsule 24 hr capsule, Take 1 capsule by mouth Daily., Disp: , Rfl:   •  torsemide (DEMADEX) 20 MG tablet, Take 1 tablet by mouth Daily., Disp: 90 tablet, Rfl: 3  •  VENTOLIN  (90 Base) MCG/ACT inhaler, Inhale 2 puffs Every 6 (Six) Hours As Needed., Disp: , Rfl:           ROS  Review of Systems   Constitutional: Positive for malaise/fatigue.   HENT: Negative.    Eyes: Negative.    Cardiovascular: Negative for chest pain and leg swelling.    Respiratory: Negative for cough, shortness of breath, sleep disturbances due to breathing, snoring and wheezing.    Endocrine: Negative.    Hematologic/Lymphatic: Negative.    Skin: Negative.    Musculoskeletal: Negative.    Gastrointestinal: Negative.    Genitourinary: Negative.    Neurological: Positive for dizziness.   Psychiatric/Behavioral: Negative.    Allergic/Immunologic: Negative.        SOCIAL HX  Social History     Socioeconomic History   • Marital status:      Spouse name: Not on file   • Number of children: Not on file   • Years of education: Not on file   • Highest education level: Not on file   Tobacco Use   • Smoking status: Former Smoker     Packs/day: 1.00     Types: Cigarettes     Quit date: 2016     Years since quittin.5   • Smokeless tobacco: Never Used   Vaping Use   • Vaping Use: Never used   Substance and Sexual Activity   • Alcohol use: No   • Drug use: No   • Sexual activity: Defer       FAMILY HX  Family History   Problem Relation Age of Onset   • Heart attack Mother    • Diabetes Father    • Hypertension Father              Osvaldo Mijares III, MD, FACC

## 2021-07-22 ENCOUNTER — HOSPITAL ENCOUNTER (OUTPATIENT)
Dept: CARDIOLOGY | Facility: HOSPITAL | Age: 61
Discharge: HOME | End: 2021-07-22

## 2021-07-22 ENCOUNTER — APPOINTMENT (OUTPATIENT)
Dept: CARDIOLOGY | Facility: HOSPITAL | Age: 61
End: 2021-07-22

## 2021-07-22 VITALS — BODY MASS INDEX: 33.96 KG/M2 | WEIGHT: 229.28 LBS | HEIGHT: 69 IN

## 2021-07-22 VITALS
HEIGHT: 69 IN | WEIGHT: 229 LBS | HEART RATE: 69 BPM | BODY MASS INDEX: 33.92 KG/M2 | DIASTOLIC BLOOD PRESSURE: 82 MMHG | SYSTOLIC BLOOD PRESSURE: 124 MMHG

## 2021-07-22 DIAGNOSIS — R07.2 PRECORDIAL PAIN: ICD-10-CM

## 2021-07-22 PROCEDURE — 93306 TTE W/DOPPLER COMPLETE: CPT | Performed by: INTERNAL MEDICINE

## 2021-07-22 PROCEDURE — 93018 CV STRESS TEST I&R ONLY: CPT | Performed by: INTERNAL MEDICINE

## 2021-07-22 PROCEDURE — 93017 CV STRESS TEST TRACING ONLY: CPT

## 2021-07-22 PROCEDURE — 78431 MYOCRD IMG PET RST&STRS CT: CPT

## 2021-07-22 PROCEDURE — 0 RUBIDIUM CHLORIDE: Performed by: INTERNAL MEDICINE

## 2021-07-22 PROCEDURE — 25010000002 SULFUR HEXAFLUORIDE MICROSPH 60.7-25 MG RECONSTITUTED SUSPENSION: Performed by: INTERNAL MEDICINE

## 2021-07-22 PROCEDURE — 78492 MYOCRD IMG PET MLT RST&STRS: CPT

## 2021-07-22 PROCEDURE — 93306 TTE W/DOPPLER COMPLETE: CPT

## 2021-07-22 PROCEDURE — 78492 MYOCRD IMG PET MLT RST&STRS: CPT | Performed by: INTERNAL MEDICINE

## 2021-07-22 PROCEDURE — 25010000002 REGADENOSON 0.4 MG/5ML SOLUTION: Performed by: INTERNAL MEDICINE

## 2021-07-22 PROCEDURE — A9555 RB82 RUBIDIUM: HCPCS | Performed by: INTERNAL MEDICINE

## 2021-07-22 RX ADMIN — RUBIDIUM CHLORIDE RB-82 1 DOSE: 150 INJECTION, SOLUTION INTRAVENOUS at 12:09

## 2021-07-22 RX ADMIN — REGADENOSON 0.4 MG: 0.08 INJECTION, SOLUTION INTRAVENOUS at 12:07

## 2021-07-22 RX ADMIN — SULFUR HEXAFLUORIDE 4 ML: KIT at 12:34

## 2021-07-22 RX ADMIN — RUBIDIUM CHLORIDE RB-82 1 DOSE: 150 INJECTION, SOLUTION INTRAVENOUS at 11:57

## 2021-07-23 ENCOUNTER — TELEPHONE (OUTPATIENT)
Dept: CARDIOLOGY | Facility: CLINIC | Age: 61
End: 2021-07-23

## 2021-07-23 LAB
BH CV ECHO MEAS - AO MAX PG (FULL): 1.3 MMHG
BH CV ECHO MEAS - AO MAX PG: 6 MMHG
BH CV ECHO MEAS - AO MEAN PG (FULL): 1 MMHG
BH CV ECHO MEAS - AO MEAN PG: 3 MMHG
BH CV ECHO MEAS - AO ROOT AREA (BSA CORRECTED): 1.6
BH CV ECHO MEAS - AO ROOT AREA: 9.1 CM^2
BH CV ECHO MEAS - AO ROOT DIAM: 3.4 CM
BH CV ECHO MEAS - AO V2 MAX: 122 CM/SEC
BH CV ECHO MEAS - AO V2 MEAN: 78.8 CM/SEC
BH CV ECHO MEAS - AO V2 VTI: 20.8 CM
BH CV ECHO MEAS - ASC AORTA: 3.3 CM
BH CV ECHO MEAS - AVA(I,A): 3 CM^2
BH CV ECHO MEAS - AVA(I,D): 3 CM^2
BH CV ECHO MEAS - AVA(V,A): 2.8 CM^2
BH CV ECHO MEAS - AVA(V,D): 2.8 CM^2
BH CV ECHO MEAS - BSA(HAYCOCK): 2.3 M^2
BH CV ECHO MEAS - BSA: 2.2 M^2
BH CV ECHO MEAS - BZI_BMI: 33.7 KILOGRAMS/M^2
BH CV ECHO MEAS - BZI_METRIC_HEIGHT: 175.3 CM
BH CV ECHO MEAS - BZI_METRIC_WEIGHT: 103.4 KG
BH CV ECHO MEAS - EDV(CUBED): 368.6 ML
BH CV ECHO MEAS - EDV(MOD-SP2): 190 ML
BH CV ECHO MEAS - EDV(MOD-SP4): 213 ML
BH CV ECHO MEAS - EDV(TEICH): 269.6 ML
BH CV ECHO MEAS - EF(CUBED): 21.6 %
BH CV ECHO MEAS - EF(MOD-BP): 19 %
BH CV ECHO MEAS - EF(MOD-SP2): 19.5 %
BH CV ECHO MEAS - EF(MOD-SP4): 19.2 %
BH CV ECHO MEAS - EF(TEICH): 16.8 %
BH CV ECHO MEAS - ESV(CUBED): 288.8 ML
BH CV ECHO MEAS - ESV(MOD-SP2): 153 ML
BH CV ECHO MEAS - ESV(MOD-SP4): 172 ML
BH CV ECHO MEAS - ESV(TEICH): 224.4 ML
BH CV ECHO MEAS - FS: 7.8 %
BH CV ECHO MEAS - IVS/LVPW: 1
BH CV ECHO MEAS - IVSD: 0.73 CM
BH CV ECHO MEAS - LA DIMENSION: 3.3 CM
BH CV ECHO MEAS - LA/AO: 0.97
BH CV ECHO MEAS - LAD MAJOR: 5.5 CM
BH CV ECHO MEAS - LAT PEAK E' VEL: 4.7 CM/SEC
BH CV ECHO MEAS - LATERAL E/E' RATIO: 14.4
BH CV ECHO MEAS - LV DIASTOLIC VOL/BSA (35-75): 97.5 ML/M^2
BH CV ECHO MEAS - LV IVRT: 0.11 SEC
BH CV ECHO MEAS - LV MASS(C)D: 226.8 GRAMS
BH CV ECHO MEAS - LV MASS(C)DI: 103.9 GRAMS/M^2
BH CV ECHO MEAS - LV MAX PG: 4.7 MMHG
BH CV ECHO MEAS - LV MEAN PG: 2 MMHG
BH CV ECHO MEAS - LV SYSTOLIC VOL/BSA (12-30): 78.8 ML/M^2
BH CV ECHO MEAS - LV V1 MAX: 108 CM/SEC
BH CV ECHO MEAS - LV V1 MEAN: 65.8 CM/SEC
BH CV ECHO MEAS - LV V1 VTI: 20.1 CM
BH CV ECHO MEAS - LVIDD: 7.2 CM
BH CV ECHO MEAS - LVIDS: 6.6 CM
BH CV ECHO MEAS - LVLD AP2: 10.4 CM
BH CV ECHO MEAS - LVLD AP4: 10.5 CM
BH CV ECHO MEAS - LVLS AP2: 10.1 CM
BH CV ECHO MEAS - LVLS AP4: 10.2 CM
BH CV ECHO MEAS - LVOT AREA (M): 3.1 CM^2
BH CV ECHO MEAS - LVOT AREA: 3.1 CM^2
BH CV ECHO MEAS - LVOT DIAM: 2 CM
BH CV ECHO MEAS - LVPWD: 0.73 CM
BH CV ECHO MEAS - MED PEAK E' VEL: 4.4 CM/SEC
BH CV ECHO MEAS - MEDIAL E/E' RATIO: 15.6
BH CV ECHO MEAS - MV A MAX VEL: 70 CM/SEC
BH CV ECHO MEAS - MV DEC TIME: 0.16 SEC
BH CV ECHO MEAS - MV E MAX VEL: 68.1 CM/SEC
BH CV ECHO MEAS - MV E/A: 0.97
BH CV ECHO MEAS - PA ACC SLOPE: 495 CM/SEC^2
BH CV ECHO MEAS - PA ACC TIME: 0.12 SEC
BH CV ECHO MEAS - PA PR(ACCEL): 25 MMHG
BH CV ECHO MEAS - SI(AO): 86.5 ML/M^2
BH CV ECHO MEAS - SI(CUBED): 36.5 ML/M^2
BH CV ECHO MEAS - SI(LVOT): 28.9 ML/M^2
BH CV ECHO MEAS - SI(MOD-SP2): 16.9 ML/M^2
BH CV ECHO MEAS - SI(MOD-SP4): 18.8 ML/M^2
BH CV ECHO MEAS - SI(TEICH): 20.7 ML/M^2
BH CV ECHO MEAS - SV(AO): 188.8 ML
BH CV ECHO MEAS - SV(CUBED): 79.8 ML
BH CV ECHO MEAS - SV(LVOT): 63.1 ML
BH CV ECHO MEAS - SV(MOD-SP2): 37 ML
BH CV ECHO MEAS - SV(MOD-SP4): 41 ML
BH CV ECHO MEAS - SV(TEICH): 45.2 ML
BH CV ECHO MEAS - TAPSE (>1.6): 2.4 CM
BH CV ECHO MEASUREMENTS AVERAGE E/E' RATIO: 14.97
BH CV REST NUCLEAR ISOTOPE DOSE: 29.9 MCI
BH CV STRESS BP STAGE 1: NORMAL
BH CV STRESS BP STAGE 3: NORMAL
BH CV STRESS COMMENTS STAGE 1: NORMAL
BH CV STRESS DOSE REGADENOSON STAGE 1: 0.4
BH CV STRESS DURATION MIN STAGE 1: 1
BH CV STRESS DURATION MIN STAGE 2: 1
BH CV STRESS DURATION MIN STAGE 3: 1
BH CV STRESS DURATION MIN STAGE 4: 1
BH CV STRESS DURATION SEC STAGE 1: 0
BH CV STRESS DURATION SEC STAGE 2: 0
BH CV STRESS DURATION SEC STAGE 3: 0
BH CV STRESS DURATION SEC STAGE 4: 0
BH CV STRESS HR STAGE 1: 71
BH CV STRESS HR STAGE 2: 81
BH CV STRESS HR STAGE 3: 77
BH CV STRESS HR STAGE 4: 78
BH CV STRESS NUCLEAR ISOTOPE DOSE: 30 MCI
BH CV STRESS O2 STAGE 1: 97
BH CV STRESS O2 STAGE 2: 99
BH CV STRESS O2 STAGE 3: 98
BH CV STRESS O2 STAGE 4: 97
BH CV STRESS PROTOCOL 1: NORMAL
BH CV STRESS RECOVERY BP: NORMAL MMHG
BH CV STRESS RECOVERY HR: 76 BPM
BH CV STRESS RECOVERY O2: 96 %
BH CV STRESS STAGE 1: 1
BH CV STRESS STAGE 2: 2
BH CV STRESS STAGE 3: 3
BH CV STRESS STAGE 4: 4
BH CV VAS BP LEFT ARM: NORMAL MMHG
BH CV XLRA - RV BASE: 3.8 CM
BH CV XLRA - RV LENGTH: 7.4 CM
BH CV XLRA - RV MID: 2.9 CM
BH CV XLRA - TDI S': 8.05 CM/SEC
LEFT ATRIUM VOLUME INDEX: 22 ML/M^2
LEFT ATRIUM VOLUME: 48 ML
MAXIMAL PREDICTED HEART RATE: 158 BPM
MAXIMAL PREDICTED HEART RATE: 158 BPM
PERCENT MAX PREDICTED HR: 55.06 %
STRESS BASELINE BP: NORMAL MMHG
STRESS BASELINE HR: 70 BPM
STRESS O2 SAT REST: 97 %
STRESS PERCENT HR: 65 %
STRESS POST ESTIMATED WORKLOAD: 1 METS
STRESS POST EXERCISE DUR MIN: 4 MIN
STRESS POST EXERCISE DUR SEC: 0 SEC
STRESS POST O2 SAT PEAK: 99 %
STRESS POST PEAK BP: NORMAL MMHG
STRESS POST PEAK HR: 87 BPM
STRESS TARGET HR: 134 BPM
STRESS TARGET HR: 134 BPM

## 2021-07-23 NOTE — TELEPHONE ENCOUNTER
----- Message from Osvaldo Mijares III, MD sent at 7/23/2021  1:55 PM EDT -----  Heart function remains low on both the echo and stress test.  Evidence of his previous heart attack but no evidence for any new blockages.  Let them know that we are going to refer him to the [facility].  Their input would be beneficial and he may qualify for heart transplant.

## 2021-08-06 NOTE — TELEPHONE ENCOUNTER
>>Pt’s daughter, Yadira, called 11/19/2020 AM & left voicemail message for Carmel Lantigua RN, to report that patient’s home monitor had alerted and the [employer] personnel had called him stating he needed to call his doctor about a transmission.   >>I spoke with pt’s daughter and informed her that due to HIPPA I would call and talk with the patient about remote transmissions.  >>Spoke with patient who states he just woke up and would I call back later.     >>Called [employer] customer care ([place]) about pt’s remote monitoring. Pt’s daughter had called [facility] on 11/18/2020 asking about pt’s transmissions. They referred her to the patient’s physician's office.     >>Pt’s daughter called back with her father on the line for a 3-person phone call. Attempted several times, unsuccessfully, to assist patient with remote transmission. Instructed pt to call [facility] for assistance.    Upon review of the In Basket request we have found that the patient has aged out of the measure and/or the document date is outside of the measure timeframe  Any additional questions or concerns should be emailed to the Practice Liaisons via [email or web address]  org email, please do not reply via In Basket      Thank you  Emma Galeana MA

## 2021-09-14 ENCOUNTER — TELEPHONE (OUTPATIENT)
Dept: CARDIOLOGY | Facility: CLINIC | Age: 61
End: 2021-09-14

## 2021-09-14 NOTE — TELEPHONE ENCOUNTER
Missed scheduled remote cardiac device transmission. I spoke with patient's daughter, she will give patient a message to transmit this afternoon/evening or call device clinic for assistance. .

## 2021-09-22 ENCOUNTER — TELEPHONE (OUTPATIENT)
Dept: CARDIOLOGY | Facility: CLINIC | Age: 61
End: 2021-09-22

## 2021-09-22 NOTE — TELEPHONE ENCOUNTER
Missed scheduled remote cardiac device transmission.     Attempted to help patient w/manual transmission-unsuccessful. Instructed pt to call [facility] for advanced trouble shooting for manual transmission-provided phone #.

## 2022-03-23 ENCOUNTER — OFFICE VISIT (OUTPATIENT)
Dept: CARDIOLOGY | Facility: CLINIC | Age: 62
End: 2022-03-23

## 2022-03-23 VITALS
SYSTOLIC BLOOD PRESSURE: 86 MMHG | BODY MASS INDEX: 32.58 KG/M2 | HEART RATE: 92 BPM | HEIGHT: 69 IN | OXYGEN SATURATION: 96 % | WEIGHT: 220 LBS | DIASTOLIC BLOOD PRESSURE: 60 MMHG

## 2022-03-23 DIAGNOSIS — I25.10 CORONARY ARTERY DISEASE INVOLVING NATIVE CORONARY ARTERY OF NATIVE HEART WITHOUT ANGINA PECTORIS: Primary | ICD-10-CM

## 2022-03-23 DIAGNOSIS — I50.22 CHRONIC SYSTOLIC CONGESTIVE HEART FAILURE: ICD-10-CM

## 2022-03-23 DIAGNOSIS — I10 PRIMARY HYPERTENSION: ICD-10-CM

## 2022-03-23 DIAGNOSIS — E78.5 DYSLIPIDEMIA: ICD-10-CM

## 2022-03-23 PROCEDURE — 93283 PRGRMG EVAL IMPLANTABLE DFB: CPT | Performed by: INTERNAL MEDICINE

## 2022-03-23 PROCEDURE — 99214 OFFICE O/P EST MOD 30 MIN: CPT | Performed by: INTERNAL MEDICINE

## 2022-03-23 RX ORDER — INSULIN GLARGINE AND LIXISENATIDE 100; 33 U/ML; UG/ML
60 INJECTION, SOLUTION SUBCUTANEOUS DAILY
COMMUNITY
Start: 2022-03-01

## 2022-03-23 RX ORDER — TRAZODONE HYDROCHLORIDE 100 MG/1
100 TABLET ORAL
COMMUNITY
Start: 2021-12-30

## 2022-03-23 NOTE — PROGRESS NOTES
[facility] at [facility]  Office visit  Pawan Easton  1960  [phone]    VISIT DATE:  3/23/2022      PCP: Osvaldo Vargas DO  [address]  [place] [address]    CC:  Chief Complaint   Patient presents with   • Coronary Artery Disease       PROBLEM LIST:  1. Coronary artery disease:  a. 12/29/2016, cardiac catheterization for ST elevated myocardial infarction with multiple lesions in the RCA in the mid and into the proximal segment. RV branch had an 80% ostial stenosis; 60% to 70% mid RCA; LAD had subtotal occlusion; circumflex OM was 40%.  b. 12/30/2015, reported placement of a Promus drug-eluting stent.  c. January 2016, cardiac catheterization for acute ST elevated myocardial infarction with placement of a 3.0 x 16 Promus drug-eluting stent to the proximal LAD. EF of 35%.  d. 02/05/2016, cardiac catheterization by Dr. Joey Self which showed an EF of 10% to 15%. Widely patent proximal mid LAD stent. Small jailed 80% to 90% first diagonal (1.5 mm vessel). Diffuse 80% stenosis of the ramus intermedius. 30% RCA and right PDA, and 50% distal LAD.   2. Ischemic cardiomyopathy/class 1-2:  a. 02/05/2016, echocardiogram with an EF of 25% to 30%, mild mitral regurgitation, and mild tricuspid regurgitation.  3. Systolic congestive heart failure:  a. February 2016 admission which required mechanical ventilation.   4. Hypertension.   5. Dyslipidemia.   6. Diabetes.   7. Chronic obstructive pulmonary disease.   8. Obstructive sleep apnea.   9. Obesity.   10. Chronic renal insufficiency.      July 2021  Ashtyn scan PET myocardial perfusion imaging  · REST EF = 25% STRESS EF = 26%.  · Left ventricular ejection fraction is severely reduced. .  · Large infarct involving the anterior, septal and apical regions. No significant shiva-infarct ischemia.  · Impressions are consistent with a high risk study.  TTE  · Left ventricular ejection fraction appears to be less than 20%. Left ventricular systolic  function is severely decreased.  · The left ventricular cavity is severely dilated.  · Left ventricular diastolic function is consistent with (grade II w/high LAP) pseudonormalization.          ASSESSMENT:   Diagnosis Plan   1. Coronary artery disease involving native coronary artery of native heart without angina pectoris     2. Dyslipidemia     3. Primary hypertension     4. Chronic systolic congestive heart failure (HCC)       Device interrogation:  [employer] dual-chamber ICD  Less 1% atrial pacing, sensed P-wave 1.4 mV, threshold 1.12 V at 0.5 ms, impedance 400 ohms  Less 1% ventricular pacing, sensed R wave greater than 12 mV, threshold 1.25 V at 0.8 ms, impedance 550 ohms  1 nonsustained PAT  Estimated battery lif 3.2-4 years    PLAN:  Heart failure with reduced ejection fraction, chronic: [place] heart class III.  Currently euvolemic.  Continue carvedilol, moderate dose Entresto and torsemide.  Did not tolerate higher dose of Entresto.   Does not qualify for CRT based on EKG or frequency of RV pacing.  Being evaluated by  advanced heart failure transplant team.  Currently does not qualify for transplant due to poorly controlled diabetes.  Has been offered LVAD but currently would like to delay this intervention.    Coronary artery disease: Currently stable asymptomatic, most recent perfusion imaging negative for ischemia.  Continue current medical therapy.    Hyperlipidemia: Continue high intensity statin therapy, goal LDL less than 100,  Ideally less than 70.      Paroxysmal atrial fibrillation: Limited burden of arrhythmia.  Not recommending initiation of anticoagulation at this time.    Subjective  Stable shortness of breath in a class III pattern.   Does have significant underlying COPD.  Pressures running less than 120/80 mmHg with intermittent symptomatic hypotension on current medical therapy.  Feels like he staggers when he walks..  Denies syncope.  No PND or orthopnea.  No lower extremity edema.   "He is compliant with medical therapy.   Current non-smoker.    PHYSICAL EXAMINATION:  Vitals:    03/23/22 1530   BP: (!) 86/60   BP Location: Left arm   Patient Position: Sitting   Pulse: 92   SpO2: 96%   Weight: 99.8 kg (220 lb)   Height: 175.3 cm (69\")     General Appearance:    Alert, cooperative, no distress, appears stated age   Head:    Normocephalic, without obvious abnormality, atraumatic   Eyes:    conjunctiva/corneas clear   Nose:   Nares normal, septum midline, mucosa normal, no drainage   Throat:   Lips, teeth and gums normal   Neck:   Supple, symmetrical, trachea midline, no carotid    bruit or JVD   Lungs:     Clear to auscultation bilaterally, respirations unlabored   Chest Wall:    No tenderness or deformity    Heart:    Regular rate and rhythm, S1 and S2 normal, no murmur, rub   or gallop, normal carotid impulse bilaterally without bruit.   Abdomen:     Soft, non-tender   Extremities:   Extremities normal, atraumatic, no cyanosis or edema   Pulses:   2+ and symmetric all extremities   Skin:   Skin color, texture, turgor normal, no rashes or lesions       Diagnostic Data:  Procedures  Lab Results   Component Value Date    CHLPL 164 04/22/2016    TRIG 668 (H) 04/22/2016    HDL 28 (L) 04/22/2016     Lab Results   Component Value Date    GLUCOSE 239 (H) 04/22/2016    BUN 19 04/22/2016    CREATININE 1.5 (H) 04/22/2016     04/22/2016    K 4.1 04/22/2016     04/22/2016    CO2 28 04/22/2016     Lab Results   Component Value Date    HGBA1C 7.7 (H) 04/22/2016     Lab Results   Component Value Date    WBC 9.28 04/22/2016    HGB 13.8 04/22/2016    HCT 39.2 04/22/2016     04/22/2016       Allergies  No Known Allergies    Current Medications    Current Outpatient Medications:   •  amitriptyline (ELAVIL) 50 MG tablet, Take 50 mg by mouth Every Night., Disp: , Rfl:   •  atorvastatin (LIPITOR) 40 MG tablet, Take 1 tablet by mouth Every Night., Disp: 90 tablet, Rfl: 3  •  buPROPion (WELLBUTRIN) 100 " MG tablet, Take 1 tablet by mouth Daily., Disp: , Rfl:   •  carvedilol (COREG) 25 MG tablet, Take 1 tablet by mouth 2 (Two) Times a Day With Meals., Disp: 180 tablet, Rfl: 3  •  clonazePAM (KlonoPIN) 0.5 MG tablet, Take 0.5 mg by mouth 2 (two) times a day as needed for seizures., Disp: , Rfl:   •  clopidogrel (PLAVIX) 75 MG tablet, Take 1 tablet by mouth Daily., Disp: 90 tablet, Rfl: 3  •  FARXIGA 10 MG tablet, Take 1 tablet by mouth Daily., Disp: , Rfl:   •  gabapentin (NEURONTIN) 800 MG tablet, Take 800 mg by mouth 3 (Three) Times a Day., Disp: , Rfl:   •  glipiZIDE (GLUCOTROL) 10 MG tablet, Take 1 tablet by mouth Daily., Disp: , Rfl:   •  insulin glargine (LANTUS) 100 UNIT/ML injection, Inject 4 Units under the skin into the appropriate area as directed Daily., Disp: , Rfl:   •  JANUVIA 50 MG tablet, Take 1 tablet by mouth Daily., Disp: , Rfl:   •  KLOR-CON 10 MEQ CR tablet, Take 1 tablet by mouth Daily., Disp: , Rfl:   •  Loratadine 10 MG capsule, Take 10 mg by mouth Daily., Disp: , Rfl:   •  meclizine (ANTIVERT) 25 MG tablet, TAKE 1 TABLET BY MOUTH THREE TIMES DAILY AS NEEDED FOR 30 DAYS, Disp: , Rfl:   •  metFORMIN (GLUCOPHAGE) 500 MG tablet, Take 500 mg by mouth 2 (Two) Times a Day With Meals., Disp: , Rfl:   •  nitroglycerin (NITROSTAT) 0.4 MG SL tablet, Place 0.4 mg under the tongue As Needed., Disp: , Rfl:   •  pantoprazole (PROTONIX) 40 MG EC tablet, Take 40 mg by mouth daily., Disp: , Rfl:   •  sacubitril-valsartan (ENTRESTO) 49-51 MG tablet, Take 1 tablet by mouth 2 (Two) Times a Day., Disp: 180 tablet, Rfl: 1  •  Soliqua 100-33 UNT-MCG/ML solution pen-injector injection, 60 Units Daily., Disp: , Rfl:   •  tamsulosin (FLOMAX) 0.4 MG capsule 24 hr capsule, Take 1 capsule by mouth Daily., Disp: , Rfl:   •  torsemide (DEMADEX) 20 MG tablet, Take 1 tablet by mouth Daily., Disp: 90 tablet, Rfl: 3  •  traZODone (DESYREL) 100 MG tablet, Take 100 mg by mouth every night at bedtime., Disp: , Rfl:   •  VENTOLIN   (90 Base) MCG/ACT inhaler, Inhale 2 puffs Every 6 (Six) Hours As Needed., Disp: , Rfl:           ROS  Review of Systems   Constitutional: Positive for malaise/fatigue.   HENT: Negative.    Eyes: Negative.    Cardiovascular: Negative for chest pain and leg swelling.   Respiratory: Negative for cough, shortness of breath, sleep disturbances due to breathing, snoring and wheezing.    Endocrine: Negative.    Hematologic/Lymphatic: Negative.    Skin: Negative.    Musculoskeletal: Negative.    Gastrointestinal: Negative.    Genitourinary: Negative.    Neurological: Positive for dizziness.   Psychiatric/Behavioral: Negative.    Allergic/Immunologic: Negative.        SOCIAL HX  Social History     Socioeconomic History   • Marital status:    Tobacco Use   • Smoking status: Former Smoker     Packs/day: 1.00     Types: Cigarettes     Quit date: 2016     Years since quittin.2   • Smokeless tobacco: Never Used   Vaping Use   • Vaping Use: Never used   Substance and Sexual Activity   • Alcohol use: No   • Drug use: No   • Sexual activity: Defer       FAMILY HX  Family History   Problem Relation Age of Onset   • Heart attack Mother    • Diabetes Father    • Hypertension Father              Osvaldo Mijares III, MD, FACC

## 2022-06-22 ENCOUNTER — TELEPHONE (OUTPATIENT)
Dept: CARDIOLOGY | Facility: CLINIC | Age: 62
End: 2022-06-22

## 2022-06-22 NOTE — TELEPHONE ENCOUNTER
I called the patient to let them know that their scheduled remote device transmission did not come through.  I was unable to successfully talk them through sending in a manual transmission. I gave him the number for [employer] tech support to call for further help with trouble shooting

## 2022-08-24 NOTE — TELEPHONE ENCOUNTER
"    PATIENT NAME: Jeff Sanchez Jr.  : 1982  DATE: 22  MRN: [id number]          Reason for Visit/Chief Complaint   Follow-up and Labs Only       History of Present Illness (HPI)     Jeff Sanchez Jr. is a 39 y.o. male presenting in clinic today Follow-up and Labs Only PMH: CHF, left ventricular thrombus (currently on warfarin), and lumbar radiculopathy with laminectomy, decompression, fusion L4-5-S1. He is currently followed by [facility]. Patient's mother is present during visit. Family on mother and father's side has a strong cardiac history. Patient's younger sister has suffered a stroke previously.     Patient was admitted into [facility] in [place], [place] with new onset CHF. At that time, he was discovered to have a left ventricular thrombus. Denies CP, SOB. He does not remember what his EF is currently, states its in the "20s." His mother states the cardiologist is considering a defibrillator if his EF is not improved at his next cardiology visit.      On 2022, patient had a retroperitoneal US for CKD3, it showed a small cyst of the left kidney and nonocclusive stone in the right kidney. An MRI was ordered, has not been scheduled yet.    On 2022, he also presented to [facility] ED with acute gastritis. He was prescribed omeprazole, sucralfate, mylanta, ondansetron and dicyclomine. CT abdomen was negative. He was discharged home. Today he states he is feeling much better. He does state at night he experiences nausea. Instructed patient to take omeprazole at bedtime.     HDL-27, Triglycerides-266. He states he was unable to eat due to excessive indigestion and nausea so he ate chicken noodle soup until he regained his strength. He denies eating fried foods, fatty foods, saturated fats.     Patient states he vapes. Denies alcohol or illicit drug use. No vaccines due today. Denies chest pain, shortness of breath, cough, headache, dizziness, weakness, abdominal pain, nausea, " Patient states ever since he started the  higher dose of Entresto  mg that he is extremely fatigued and cannot do much of anything. He is requesting the dose to be lowered. Please advise.   vomiting, diarrhea, constipation, dysuria, depression, anxiety, SI, and HI.     Prostate Cancer Screening - No prior screening. PSA ordered. Follow up annually.   Colon Cancer Screening -Deferred due to age. Will initiate testing at age 43.  Vaccinations: Links failed at 1305.           Review of Systems     Review of Systems    Medical / Social / Family History     Past Medical History:   Diagnosis Date    Cerebrovascular disease     CHF (congestive heart failure)     HLD (hyperlipidemia)     Hypertension     Lumbar herniated disc     Thrombus     LEFT VENTRICULAR         History reviewed. No pertinent surgical history.      Social History  Jeff Sanchez Jr.'s  reports that he has been smoking vaping w/o nicotine. He uses smokeless tobacco. He reports previous alcohol use. He reports that he does not use drugs.    Family History  Jeff Sanchez Jr.'s family history includes Diabetes in his mother; Heart attack in his maternal grandfather and paternal grandfather; Hyperlipidemia in his mother; Hypertension in his mother and sister; Lung cancer in his father; Schizophrenia in his father; Stroke in his mother and sister.    Medications and Allergies     Medications  Medication List with Changes/Refills   Current Medications    ASPIRIN 325 MG CAP    Take 325 mg by mouth once daily at 6am.    ATORVASTATIN (LIPITOR) 40 MG TABLET    Take 40 mg by mouth once daily.    CARVEDILOL (COREG) 6.25 MG TABLET    Take 6.25 mg by mouth 2 (two) times daily.    FUROSEMIDE (LASIX) 40 MG TABLET    Take 40 mg by mouth once daily.    GLIPIZIDE (GLUCOTROL) 2.5 MG TR24    Take 1 tablet (2.5 mg total) by mouth daily with breakfast.    LISINOPRIL 10 MG TABLET    Take 10 mg by mouth once daily.    ONDANSETRON (ZOFRAN-ODT) 4 MG TBDL    Take by mouth daily as needed for Nausea.    WARFARIN (COUMADIN) 5 MG TABLET    Take 5 mg by mouth.   Changed and/or Refilled Medications    Modified Medication Previous Medication    DICYCLOMINE (BENTYL)  20 MG TABLET dicyclomine (BENTYL) 20 mg tablet       Take 1 tablet (20 mg total) by mouth 2 (two) times daily as needed (abdominal cramps).    Take 20 mg by mouth 2 (two) times daily.    OMEPRAZOLE (PRILOSEC) 20 MG CAPSULE omeprazole (PRILOSEC) 20 MG capsule       Take 1 capsule (20 mg total) by mouth every evening.    Take 20 mg by mouth once daily.    SUCRALFATE (CARAFATE) 1 GRAM TABLET sucralfate (CARAFATE) 1 gram tablet       Take 1 tablet (1 g total) by mouth 3 (three) times daily.    Take 1 g by mouth 4 (four) times daily.   Discontinued Medications    WARFARIN (COUMADIN) 6 MG TABLET    Take 1 tablet (6 mg total) by mouth Daily.         Allergies  Review of patient's allergies indicates:  No Known Allergies    Physical Examination     Vitals:    08/24/22 1314   BP:    Pulse: (!) 48   Resp:    Temp:      Physical Exam      Results     Lab Results   Component Value Date    WBC 7.4 05/26/2022    RBC 4.93 05/26/2022    HGB 14.2 05/26/2022    HCT 43.5 05/26/2022    MCV 88.2 05/26/2022    MCH 28.8 05/26/2022    MCHC 32.6 (L) 05/26/2022    RDW 14.1 05/26/2022     05/26/2022    MPV 11.1 05/26/2022     Lab Results   Component Value Date     08/18/2022    K 3.9 08/18/2022    CO2 29 08/18/2022    BUN 15.1 08/18/2022    CREATININE 1.15 08/18/2022    CALCIUM 9.8 08/18/2022    ALBUMIN 4.2 08/18/2022    BILITOT 0.7 08/18/2022    ALKPHOS 99 08/18/2022    AST 18 08/18/2022    ALT 31 08/18/2022    EGFRNONAA 49 05/26/2022     Lab Results   Component Value Date    TSH 0.4059 08/18/2022     Lab Results   Component Value Date    CHOL 151 08/18/2022    HDL 27 (L) 08/18/2022    LDL 71.00 08/18/2022    TRIG 266 (H) 08/18/2022     Lab Results   Component Value Date    APPEARANCEUA Clear 05/26/2022    LEUKOCYTESUR Negative 05/26/2022    RBCUA 0-5 05/26/2022    WBCUA 0-5 05/26/2022    BACTERIA None Seen 05/26/2022    HYALINECASTS 0-2 (A) 05/26/2022     Lab Results   Component Value Date    CREATRANDUR 47.8 (L) 05/26/2022      Lab Results   Component Value Date    RAVPOWXZ87NY 39.3 08/18/2022     Lab Results   Component Value Date    HEPAIGM Nonreactive 08/18/2022    HEPBCOREM Nonreactive 08/18/2022    HEPCAB Nonreactive 08/18/2022     No results found for: FITCHEMO COLOGUARD  No results found for: OCCBLDIA        Assessment and Plan (including Health Maintenance)     Health Maintenance Due   Topic Date Due    COVID-19 Vaccine (1) Never done    HIV Screening  Never done       Problem List Items Addressed This Visit        Cardiac/Vascular    Congestive heart failure    Current Assessment & Plan     Continue carvedilol, lasix, lisinopril.  Keep Cardiology Clinic appts as scheduled.  Notify cardiologist Dr. Garcia of bradycardia.  Notify the clinic if you gain 3 or more pounds in one day or 5 or more pounds in one week.  Stressed importance of daily morning weights after urination but prior to breakfast on the same scale.  Low Sodium Diet (Dash Diet - less than 2 grams of sodium per day).  Follow a low cholesterol, low saturated fat diet with less that 200mg of cholesterol a day.  Cut down on alcohol if you have more than 1 drink a day (for women) or 2 drinks a day (for men).  Maintain healthy weight with goal BMI <30. Perform 30 minutes of daily physical activity 5 days per week.  Report to ER for chest pain, SOB, difficulty breathing, abdominal distention or significant edema to lower extremities.  Most recent ECHO in June per patient, will request records.             Relevant Orders    Iron and TIBC    Left ventricular thrombus    Current Assessment & Plan     Currently followed by [facility] coumadin clinic-next appt: 9/8/2022.  Maintain INR 2-3.  8/24/2022-INR: 3.6-warfarin decreased to 5 mg.  ED precautions for bleeding.           Mixed hyperlipidemia    Current Assessment & Plan     Lab Results   Component Value Date    LDL 71.00 08/18/2022       Lab Results   Component Value Date    TRIG 266 (H) 08/18/2022       Lab Results    Component Value Date    HDL 27 (L) 08/18/2022        Lab Results   Component Value Date    CHOL 151 08/18/2022      Continue atorvastatin as prescribed.   Follow a low cholesterol, low saturated fat diet with less than 200 mg of cholesterol a day.   Avoid fried foods and high saturated fats.  Add flax seed or fish oil supplements to diet.   Increase dietary fiber.   Regular exercise improves cholesterol levels.  Physical activity 5 times a week for 30 minutes per day (or 150 minutes per week).   Stressed importance of dietary modifications.             Relevant Orders    Lipid Panel       Endocrine    BMI 26.0-26.9,adult    Current Assessment & Plan     Body mass index is 26.76 kg/m². (At goal).             Type 2 diabetes mellitus with kidney complication, without long-term current use of insulin    Current Assessment & Plan     Lab Results   Component Value Date    HGBA1C 5.3 08/18/2022     at goal.  Continue glipizide as prescribed.   Follow ADA diet.   Avoid soda, simple sweets, and limit rice/pasta/bread/starches and consume brown options when possible.    Maintain healthy weight with BMI goal <30.    Perform aerobic exercise for 150 minutes per week (or 5 days a week for 30 minutes each day).    Examine feet daily.    Obtain annual dilated eye exam.   Eye exam: Deferred until next visit.  Foot exam: Deferred until next visit.           Relevant Orders    CBC Auto Differential    Comprehensive Metabolic Panel    Microalbumin/Creatinine Ratio, Urine    Urinalysis, Reflex to Urine Culture Urine, Clean Catch    Hemoglobin A1C       GI    Gastroesophageal reflux disease without esophagitis - Primary    Current Assessment & Plan     Avoid spicy, acidic, fried food and alcohol.    Eat 2-3 hours before bed.   Avoid tight fitting clothes and chew food thoroughly.    Reduce caffeine intake, avoid soda.    Take omeprazole and sucralfate as prescribed.    Encouraged vaping cessation.              Relevant Medications     omeprazole (PRILOSEC) 20 MG capsule    Abdominal cramping    Current Assessment & Plan     Continue dicyclomine-refilled today.           Relevant Medications    sucralfate (CARAFATE) 1 gram tablet    dicyclomine (BENTYL) 20 mg tablet       Other    Vapes non-nicotine containing substance    Current Assessment & Plan     Vaping cessation discussed.             Other Visit Diagnoses     Screening for HIV (human immunodeficiency virus)        Relevant Orders    HIV 1/2 Ag/Ab (4th Gen)    Encounter for vitamin deficiency screening        Relevant Orders    Vitamin D    Screening for prostate cancer        Relevant Orders    PSA, Screening          Health Maintenance Topics with due status: Not Due       Topic Last Completion Date    Lipid Panel 08/18/2022    High Dose Statin 08/24/2022    Influenza Vaccine Not Due       Future Appointments   Date Time Provider Department Center   9/8/2022  1:20 PM COUMADIN NURSE, [facility] INTERNAL MEDICINE RESIDENTS [facility] IM RES [facility] Un   9/14/2022 12:15 PM KELVIN Patel [facility] NEPHR [facility]   12/27/2022 12:00 PM KELVIN Pineda [facility] INTMED [facility] Un          RTC in 4 months and prn.  Labs within a week of visit.    Signature:        KELVIN Pineda  [facility]  [address] [facility] [id number]    Date of encounter: 8/24/22

## 2022-09-21 ENCOUNTER — TELEPHONE (OUTPATIENT)
Dept: CARDIOLOGY | Facility: CLINIC | Age: 62
End: 2022-09-21

## 2022-09-21 NOTE — TELEPHONE ENCOUNTER
Called Mr Easton due to [employer] home monitor didn't send in scheduled reading.  Left message for a return call.

## 2022-10-10 ENCOUNTER — OFFICE VISIT (OUTPATIENT)
Dept: CARDIOLOGY | Facility: CLINIC | Age: 62
End: 2022-10-10

## 2022-10-10 VITALS
HEIGHT: 69 IN | BODY MASS INDEX: 29.62 KG/M2 | DIASTOLIC BLOOD PRESSURE: 60 MMHG | SYSTOLIC BLOOD PRESSURE: 96 MMHG | OXYGEN SATURATION: 100 % | WEIGHT: 200 LBS | HEART RATE: 100 BPM

## 2022-10-10 DIAGNOSIS — I50.22 CHRONIC SYSTOLIC CONGESTIVE HEART FAILURE: ICD-10-CM

## 2022-10-10 DIAGNOSIS — E78.5 DYSLIPIDEMIA: ICD-10-CM

## 2022-10-10 DIAGNOSIS — I25.10 CORONARY ARTERY DISEASE INVOLVING NATIVE CORONARY ARTERY OF NATIVE HEART WITHOUT ANGINA PECTORIS: Primary | ICD-10-CM

## 2022-10-10 DIAGNOSIS — I10 PRIMARY HYPERTENSION: ICD-10-CM

## 2022-10-10 PROCEDURE — 93283 PRGRMG EVAL IMPLANTABLE DFB: CPT | Performed by: INTERNAL MEDICINE

## 2022-10-10 PROCEDURE — 99214 OFFICE O/P EST MOD 30 MIN: CPT | Performed by: INTERNAL MEDICINE

## 2022-10-10 NOTE — PROGRESS NOTES
[facility]  Office visit  Pawan Easton  1960  [phone]    VISIT DATE:  10/10/2022      PCP: Osvaldo Vargas DO  [address]  [place] [address]    CC:  Chief Complaint   Patient presents with   • Coronary Artery Disease       PROBLEM LIST:  1. Coronary artery disease:  a. 12/29/2016, cardiac catheterization for ST elevated myocardial infarction with multiple lesions in the RCA in the mid and into the proximal segment. RV branch had an 80% ostial stenosis; 60% to 70% mid RCA; LAD had subtotal occlusion; circumflex OM was 40%.  b. 12/30/2015, reported placement of a Promus drug-eluting stent.  c. January 2016, cardiac catheterization for acute ST elevated myocardial infarction with placement of a 3.0 x 16 Promus drug-eluting stent to the proximal LAD. EF of 35%.  d. 02/05/2016, cardiac catheterization by Dr. Joey Self which showed an EF of 10% to 15%. Widely patent proximal mid LAD stent. Small jailed 80% to 90% first diagonal (1.5 mm vessel). Diffuse 80% stenosis of the ramus intermedius. 30% RCA and right PDA, and 50% distal LAD.   2. Ischemic cardiomyopathy/class 1-2:  a. 02/05/2016, echocardiogram with an EF of 25% to 30%, mild mitral regurgitation, and mild tricuspid regurgitation.  3. Systolic congestive heart failure:  a. February 2016 admission which required mechanical ventilation.   4. Hypertension.   5. Dyslipidemia.   6. Diabetes.   7. Chronic obstructive pulmonary disease.   8. Obstructive sleep apnea.   9. Obesity.   10. Chronic renal insufficiency.      July 2021  Ashtyn scan PET myocardial perfusion imaging  · REST EF = 25% STRESS EF = 26%.  · Left ventricular ejection fraction is severely reduced. .  · Large infarct involving the anterior, septal and apical regions. No significant shiva-infarct ischemia.  · Impressions are consistent with a high risk study.  TTE  · Left ventricular ejection fraction appears to be less than 20%. Left ventricular systolic  function is severely decreased.  · The left ventricular cavity is severely dilated.  · Left ventricular diastolic function is consistent with (grade II w/high LAP) pseudonormalization.          ASSESSMENT:   Diagnosis Plan   1. Coronary artery disease involving native coronary artery of native heart without angina pectoris        2. Dyslipidemia        3. Primary hypertension        4. Chronic systolic congestive heart failure (HCC)          Device interrogation:  [employer] dual-chamber ICD  Less 1% atrial pacing, sensed P-wave 1.3 mV, threshold 0.87 V at 0.5 ms, impedance 480 ohms  Less 1% ventricular pacing, sensed R wave greater than 12 mV, threshold 1.25 V at 0.8 ms, impedance 640 ohms  Estimated battery lif 3.4 years    PLAN:  Heart failure with reduced ejection fraction, chronic: [place] heart class III.  Currently euvolemic.  Continue carvedilol, moderate dose Entresto and torsemide.  Did not tolerate higher dose of Entresto.   Does not qualify for CRT based on EKG or frequency of RV pacing.  Being evaluated by  advanced heart failure transplant team.  Currently does not qualify for transplant due to poorly controlled diabetes.  Has been offered LVAD but currently would like to delay this intervention.  Home milrinone infusion is too expensive.  Symptomatic hypotension limiting further titration of medical therapy.    Coronary artery disease: Currently stable asymptomatic, most recent perfusion imaging negative for ischemia.  Continue current medical therapy.    Hyperlipidemia: Continue high intensity statin therapy, goal LDL less than 100,  Ideally less than 70.      Paroxysmal atrial fibrillation: Limited burden of arrhythmia.  Not recommending initiation of anticoagulation at this time.    Subjective  Stable shortness of breath in a class III pattern.  Rare episodes of orthostasis.  Does have significant underlying COPD.  Pressures running less than 110/70 mmHg with intermittent symptomatic hypotension  "on current medical therapy.   Denies syncope.  No PND or orthopnea.  No lower extremity edema.  He is compliant with medical therapy.   Current non-smoker.    PHYSICAL EXAMINATION:  Vitals:    10/10/22 1352   BP: 96/60   BP Location: Left arm   Patient Position: Sitting   Pulse: 100   SpO2: 100%   Weight: 90.7 kg (200 lb)   Height: 175.3 cm (69\")     General Appearance:    Alert, cooperative, no distress, appears stated age   Head:    Normocephalic, without obvious abnormality, atraumatic   Eyes:    conjunctiva/corneas clear   Nose:   Nares normal, septum midline, mucosa normal, no drainage   Throat:   Lips, teeth and gums normal   Neck:   Supple, symmetrical, trachea midline, no carotid    bruit or JVD   Lungs:     Clear to auscultation bilaterally, respirations unlabored   Chest Wall:    No tenderness or deformity    Heart:    Regular rate and rhythm, S1 and S2 normal, no murmur, rub   or gallop, normal carotid impulse bilaterally without bruit.   Abdomen:     Soft, non-tender   Extremities:   Extremities normal, atraumatic, no cyanosis or edema   Pulses:   2+ and symmetric all extremities   Skin:   Skin color, texture, turgor normal, no rashes or lesions       Diagnostic Data:  Procedures  Lab Results   Component Value Date    CHLPL 164 04/22/2016    TRIG 668 (H) 04/22/2016    HDL 28 (L) 04/22/2016     Lab Results   Component Value Date    GLUCOSE 239 (H) 04/22/2016    BUN 18 08/13/2021    CREATININE 1.25 08/13/2021     08/13/2021    K 4.1 08/13/2021     08/13/2021    CO2 27 08/13/2021     Lab Results   Component Value Date    HGBA1C 8.6 (A) 11/10/2021     Lab Results   Component Value Date    WBC 9.28 04/22/2016    HGB 13.8 04/22/2016    HCT 39.2 04/22/2016     04/22/2016       Allergies  No Known Allergies    Current Medications    Current Outpatient Medications:   •  amitriptyline (ELAVIL) 50 MG tablet, Take 50 mg by mouth Every Night., Disp: , Rfl:   •  atorvastatin (LIPITOR) 40 MG tablet, " Take 1 tablet by mouth Every Night., Disp: 90 tablet, Rfl: 3  •  buPROPion (WELLBUTRIN) 100 MG tablet, Take 1 tablet by mouth Daily., Disp: , Rfl:   •  carvedilol (COREG) 25 MG tablet, Take 1 tablet by mouth 2 (Two) Times a Day With Meals., Disp: 180 tablet, Rfl: 3  •  clonazePAM (KlonoPIN) 0.5 MG tablet, Take 0.5 mg by mouth 2 (two) times a day as needed for seizures., Disp: , Rfl:   •  clopidogrel (PLAVIX) 75 MG tablet, Take 1 tablet by mouth Daily., Disp: 90 tablet, Rfl: 3  •  FARXIGA 10 MG tablet, Take 1 tablet by mouth Daily., Disp: , Rfl:   •  gabapentin (NEURONTIN) 800 MG tablet, Take 800 mg by mouth 3 (Three) Times a Day., Disp: , Rfl:   •  glipiZIDE (GLUCOTROL) 10 MG tablet, Take 1 tablet by mouth Daily., Disp: , Rfl:   •  insulin glargine (LANTUS) 100 UNIT/ML injection, Inject 4 Units under the skin into the appropriate area as directed Daily., Disp: , Rfl:   •  JANUVIA 50 MG tablet, Take 1 tablet by mouth Daily., Disp: , Rfl:   •  KLOR-CON 10 MEQ CR tablet, Take 1 tablet by mouth Daily., Disp: , Rfl:   •  Loratadine 10 MG capsule, Take 10 mg by mouth Daily., Disp: , Rfl:   •  meclizine (ANTIVERT) 25 MG tablet, TAKE 1 TABLET BY MOUTH THREE TIMES DAILY AS NEEDED FOR 30 DAYS, Disp: , Rfl:   •  metFORMIN (GLUCOPHAGE) 500 MG tablet, Take 500 mg by mouth 2 (Two) Times a Day With Meals., Disp: , Rfl:   •  nitroglycerin (NITROSTAT) 0.4 MG SL tablet, Place 0.4 mg under the tongue As Needed., Disp: , Rfl:   •  pantoprazole (PROTONIX) 40 MG EC tablet, Take 40 mg by mouth daily., Disp: , Rfl:   •  sacubitril-valsartan (ENTRESTO) 49-51 MG tablet, Take 1 tablet by mouth 2 (Two) Times a Day., Disp: 180 tablet, Rfl: 1  •  Soliqua 100-33 UNT-MCG/ML solution pen-injector injection, 60 Units Daily., Disp: , Rfl:   •  tamsulosin (FLOMAX) 0.4 MG capsule 24 hr capsule, Take 1 capsule by mouth Daily., Disp: , Rfl:   •  torsemide (DEMADEX) 20 MG tablet, Take 1 tablet by mouth Daily., Disp: 90 tablet, Rfl: 3  •  traZODone  (DESYREL) 100 MG tablet, Take 100 mg by mouth every night at bedtime., Disp: , Rfl:   •  VENTOLIN  (90 Base) MCG/ACT inhaler, Inhale 2 puffs Every 6 (Six) Hours As Needed., Disp: , Rfl:           ROS  Review of Systems   Constitutional: Positive for malaise/fatigue.   HENT: Negative.    Eyes: Negative.    Cardiovascular: Negative for chest pain and leg swelling.   Respiratory: Negative for cough, shortness of breath, sleep disturbances due to breathing, snoring and wheezing.    Endocrine: Negative.    Hematologic/Lymphatic: Negative.    Skin: Negative.    Musculoskeletal: Negative.    Gastrointestinal: Negative.    Genitourinary: Negative.    Neurological: Positive for dizziness.   Psychiatric/Behavioral: Negative.    Allergic/Immunologic: Negative.        SOCIAL HX  Social History     Socioeconomic History   • Marital status:    Tobacco Use   • Smoking status: Former     Packs/day: 1.00     Types: Cigarettes     Quit date: 2016     Years since quittin.8   • Smokeless tobacco: Never   Vaping Use   • Vaping Use: Never used   Substance and Sexual Activity   • Alcohol use: No   • Drug use: No   • Sexual activity: Defer       FAMILY HX  Family History   Problem Relation Age of Onset   • Heart attack Mother    • Diabetes Father    • Hypertension Father              Osvaldo Mijares III, MD, FACC

## 2022-12-21 ENCOUNTER — TELEPHONE (OUTPATIENT)
Dept: CARDIOLOGY | Facility: CLINIC | Age: 62
End: 2022-12-21

## 2022-12-21 NOTE — TELEPHONE ENCOUNTER
Left a message on Mr. Easton's phone letting him know that his device isn't connecting with his beside home monitor. Asked him to try to send in a manual transmission or to call the phone number on his monitor for assistance.      I also left my name and number for him to call if he needs it.

## 2023-05-10 ENCOUNTER — CLINICAL SUPPORT NO REQUIREMENTS (OUTPATIENT)
Dept: CARDIOLOGY | Facility: CLINIC | Age: 63
End: 2023-05-10
Payer: MEDICARE

## 2023-05-10 DIAGNOSIS — I50.22 CHRONIC SYSTOLIC CONGESTIVE HEART FAILURE: Primary | ICD-10-CM

## 2023-05-10 NOTE — PROGRESS NOTES
Pt presents to the cardiology department and requests an in-office cardiac device check. Pt reported to the  staff that he lives 2 1/2 hours away and was in [place], [place], with a family member for her medical appointments. In-office [employer] dual chamber ICD interrogated-normal device function, no alerts, 1 non-sustained VT episode, 4 seconds duration, 180 bpm. Device interrogation placed on Dr. Mijares's desk.